# Patient Record
Sex: MALE | Race: WHITE | NOT HISPANIC OR LATINO | Employment: OTHER | URBAN - METROPOLITAN AREA
[De-identification: names, ages, dates, MRNs, and addresses within clinical notes are randomized per-mention and may not be internally consistent; named-entity substitution may affect disease eponyms.]

---

## 2020-12-02 ENCOUNTER — HISTORICAL (OUTPATIENT)
Dept: MEDSURG UNIT | Facility: HOSPITAL | Age: 65
End: 2020-12-02

## 2020-12-03 LAB
ABS NEUT (OLG): 4.69 X10(3)/MCL (ref 2.1–9.2)
ALBUMIN SERPL-MCNC: 3.5 GM/DL (ref 3.4–4.8)
ALBUMIN/GLOB SERPL: 1.3 RATIO (ref 1.1–2)
ALP SERPL-CCNC: 139 UNIT/L (ref 40–150)
ALT SERPL-CCNC: 23 UNIT/L (ref 0–55)
AST SERPL-CCNC: 25 UNIT/L (ref 5–34)
BASOPHILS # BLD AUTO: 0 X10(3)/MCL (ref 0–0.2)
BASOPHILS NFR BLD AUTO: 0 %
BILIRUB SERPL-MCNC: 0.9 MG/DL
BILIRUBIN DIRECT+TOT PNL SERPL-MCNC: 0.4 MG/DL (ref 0–0.5)
BILIRUBIN DIRECT+TOT PNL SERPL-MCNC: 0.5 MG/DL (ref 0–0.8)
BUN SERPL-MCNC: 16.7 MG/DL (ref 8.4–25.7)
CALCIUM SERPL-MCNC: 8.7 MG/DL (ref 8.8–10)
CHLORIDE SERPL-SCNC: 105 MMOL/L (ref 98–107)
CO2 SERPL-SCNC: 22 MMOL/L (ref 23–31)
CREAT SERPL-MCNC: 0.81 MG/DL (ref 0.73–1.18)
EOSINOPHIL # BLD AUTO: 0.4 X10(3)/MCL (ref 0–0.9)
EOSINOPHIL NFR BLD AUTO: 5 %
ERYTHROCYTE [DISTWIDTH] IN BLOOD BY AUTOMATED COUNT: 13.9 % (ref 11.5–17)
GLOBULIN SER-MCNC: 2.6 GM/DL (ref 2.4–3.5)
GLUCOSE SERPL-MCNC: 95 MG/DL (ref 82–115)
HCT VFR BLD AUTO: 45 % (ref 42–52)
HGB BLD-MCNC: 14.5 GM/DL (ref 14–18)
LYMPHOCYTES # BLD AUTO: 2.4 X10(3)/MCL (ref 0.6–4.6)
LYMPHOCYTES NFR BLD AUTO: 28 %
MCH RBC QN AUTO: 30.7 PG (ref 27–31)
MCHC RBC AUTO-ENTMCNC: 32.2 GM/DL (ref 33–36)
MCV RBC AUTO: 95.3 FL (ref 80–94)
MONOCYTES # BLD AUTO: 0.9 X10(3)/MCL (ref 0.1–1.3)
MONOCYTES NFR BLD AUTO: 11 %
NEUTROPHILS # BLD AUTO: 4.69 X10(3)/MCL (ref 2.1–9.2)
NEUTROPHILS NFR BLD AUTO: 56 %
PLATELET # BLD AUTO: 194 X10(3)/MCL (ref 130–400)
PMV BLD AUTO: 9.4 FL (ref 9.4–12.4)
POTASSIUM SERPL-SCNC: 4.5 MMOL/L (ref 3.5–5.1)
PROT SERPL-MCNC: 6.1 GM/DL (ref 5.8–7.6)
RBC # BLD AUTO: 4.72 X10(6)/MCL (ref 4.7–6.1)
SODIUM SERPL-SCNC: 133 MMOL/L (ref 136–145)
WBC # SPEC AUTO: 8.4 X10(3)/MCL (ref 4.5–11.5)

## 2023-11-17 DIAGNOSIS — I73.9 PAD (PERIPHERAL ARTERY DISEASE): Primary | ICD-10-CM

## 2023-11-17 RX ORDER — NITROFURANTOIN 25; 75 MG/1; MG/1
100 CAPSULE ORAL NIGHTLY
Status: ON HOLD | COMMUNITY
Start: 2023-09-28 | End: 2023-11-29 | Stop reason: HOSPADM

## 2023-11-17 RX ORDER — CLOPIDOGREL BISULFATE 75 MG/1
75 TABLET ORAL DAILY
COMMUNITY
Start: 2023-11-10

## 2023-11-17 RX ORDER — METOPROLOL SUCCINATE 25 MG/1
25 TABLET, EXTENDED RELEASE ORAL DAILY
COMMUNITY
Start: 2023-10-28

## 2023-11-17 RX ORDER — FINASTERIDE 5 MG/1
5 TABLET, FILM COATED ORAL DAILY
COMMUNITY
Start: 2023-10-28

## 2023-11-17 RX ORDER — LOSARTAN POTASSIUM 50 MG/1
50 TABLET ORAL DAILY
COMMUNITY
Start: 2023-09-29

## 2023-11-17 RX ORDER — DOXYCYCLINE 100 MG/1
100 CAPSULE ORAL 2 TIMES DAILY
Status: ON HOLD | COMMUNITY
Start: 2023-11-10 | End: 2023-11-29 | Stop reason: HOSPADM

## 2023-11-17 RX ORDER — ATORVASTATIN CALCIUM 80 MG/1
80 TABLET, FILM COATED ORAL NIGHTLY
COMMUNITY
Start: 2023-11-10

## 2023-11-17 RX ORDER — RIVAROXABAN 2.5 MG/1
2.5 TABLET, FILM COATED ORAL 2 TIMES DAILY
COMMUNITY
Start: 2023-11-15

## 2023-11-17 RX ORDER — MUPIROCIN 20 MG/G
OINTMENT TOPICAL 2 TIMES DAILY
COMMUNITY
Start: 2023-09-27

## 2023-11-17 RX ORDER — TRAZODONE HYDROCHLORIDE 50 MG/1
50 TABLET ORAL NIGHTLY PRN
COMMUNITY
Start: 2023-10-23 | End: 2023-11-20

## 2023-11-17 NOTE — H&P (VIEW-ONLY)
"    [facility] - Clinic Note  James Marx MD      Patient Name: Deniz Pace                   : 1955      MRN: [id number]   Visit Date: 2023       History Present Illness     Reason for Visit: Arterial Disease    Mr. Pace presents to the clinic in need of evaluation of lower extremity bypass. He is being referred by Dr. Burciaga. He was recently discharged from [facility].   He is s/p second and third toe surgery secondary to hammertoe 11/3/23. Admitted to [facility] 11/15/23 for peripheral angiogram done during hospitalization. 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 He has had multiple interventions without improvement. He states he gets claudication at approximately 35 yards. He is walking less at this time due to his wound.  He denies history of smoking or diabetes.  He is on aspirin, Xarelto, and Plavix.      REVIEW OF SYSTEMS:    12 point review of systems conducted, negative except as stated in the history of present illness. See HPI for details.        Physical Exam      Vitals:    23 0903 23 0906   BP: (!) 151/81 (!) 150/79   BP Location: Right arm Left arm   Pulse: 65 66   Weight: 74.8 kg (165 lb)    Height: 5' 8" (1.727 m)           General: well-nourished, no acute distress, and healthy appearing, ambulating normally, alert, pleasant, conversant, and oriented  Neurologic: cranial nerves are grossly intact, no neurologic deficits, no motor deficits, and no sensory deficits  HEENT: grossly normal and no scleral icterus  Neck/Chest: normal , soft without lymphadenopathy, and palpable carotid pulses bilaterally  Respiratory: breathing easily, without respiratory distress, and normal breath sounds  Abdomen: normal and soft  Cardiology: regular rate and rhythm and no audible murmur    Upper Extremity Arterial Exam:   Right - radial is palpable and brachial is palpable  Left - radial is palpable and brachial is palpable    Lower Extremity " Arterial Exam:  Right - posterior tibial is monophasic with doppler and dorsalis pedis is monophasic with doppler    Musculoskeletal:   Lower Extremity: no edema present to bilateral lower extremities, Right second toe necrotic wound            Assessment and Plan     Mr. Pace is a 68 y.o. with right lower extremity critical limb ischemia.  He was failed multiple interventions from an endovascular standpoint in regards to revascularization of his right lower extremity.  I have discussed with him based on femoral to tibial bypass as.  His popliteal below-the-knee is occluded as is his TP trunk.  His AT is heavily calcified.  His PT and peroneal are patent and is PT does appear relatively healthy.  We will have him vein mapped in the office to determine if he was adequate saphenous vein, otherwise we will proceed with prosthetic bypass.  I have discussed this procedure with him including risks and benefits.  He was include but are not limited to infection, bleeding, scar, graft failure, and possible amputation.    1. 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  He was right SFA and popliteal has a subintimal stent was does have stenosis within it.  The popliteal occludes just above the knee.  He does reconstitute the trifurcation via collaterals.  The distal popliteal and TP trunk were all occluded.  The AT is patent onto the ankle, but does appear calcified.  The peroneal was smaller diameter but is patent.  The PT does not appear healthy a noncalcified and patent to the ankle.      Medical History     Past Medical History:   Diagnosis Date    Bipolar 1 disorder     CAD (coronary artery disease)     Exertional dyspnea     Hepatitis C     Lung mass  06/29/2016    right lung mass - Dr. Silverio Schroeder    MI (myocardial infarction)     LEONID (obstructive sleep apnea)     PVD (peripheral vascular disease)      Past Surgical History:   Procedure Laterality Date    CORONARY ARTERY BYPASS GRAFT  2013    FEMORAL ARTERY STENT Left     HIP SURGERY       History reviewed. No pertinent family history.  Social History     Socioeconomic History    Marital status: Single   Tobacco Use    Smoking status: Never    Smokeless tobacco: Never     Current Outpatient Medications   Medication Instructions    amoxicillin (AMOXIL) 875 mg, Oral, 2 times daily    aspirin (ECOTRIN) 81 mg, Oral, Daily    atorvastatin (LIPITOR) 80 mg, Oral, Nightly    clopidogreL (PLAVIX) 75 mg, Oral    doxycycline (MONODOX) 100 mg, Oral, 2 times daily    DULoxetine (CYMBALTA) 60 mg, Oral, Daily    finasteride (PROSCAR) 5 mg, Oral    furosemide (LASIX) 20 mg, Oral, Daily    losartan (COZAAR) 50 mg, Oral    metoprolol succinate (TOPROL-XL) 25 mg, Oral    mupirocin (BACTROBAN) 2 % ointment Topical (Top), 2 times daily    nitrofurantoin, macrocrystal-monohydrate, (MACROBID) 100 MG capsule 100 mg, Oral, Nightly    POLYETHYLENE GLYCOL 3350 (MIRALAX ORAL) Oral    QUEtiapine (SEROQUEL) 200 mg, Oral, Nightly    XARELTO 2.5 mg Tab Oral     Review of patient's allergies indicates:   Allergen Reactions    Lithium analogues Other (See Comments)     coma       Patient Care Team:  No, Primary Doctor as PCP - Iggy Hernandez MD as Consulting Physician (Cardiovascular Disease)        No follow-ups on file. In addition to their scheduled follow up, the patient has also been instructed to follow up on as needed basis.     No future appointments.

## 2023-11-17 NOTE — PROGRESS NOTES
"    [facility] - Clinic Note  James Marx MD      Patient Name: Deniz Pace                   : 1955      MRN: [id number]   Visit Date: 2023       History Present Illness     Reason for Visit: Arterial Disease    Mr. Pace presents to the clinic in need of evaluation of lower extremity bypass. He is being referred by Dr. Burciaga. He was recently discharged from [facility].   He is s/p second and third toe surgery secondary to hammertoe 11/3/23. Admitted to [facility] 11/15/23 for peripheral angiogram done during hospitalization. 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 He has had multiple interventions without improvement. He states he gets claudication at approximately 35 yards. He is walking less at this time due to his wound.  He denies history of smoking or diabetes.  He is on aspirin, Xarelto, and Plavix.      REVIEW OF SYSTEMS:    12 point review of systems conducted, negative except as stated in the history of present illness. See HPI for details.        Physical Exam      Vitals:    23 0903 23 0906   BP: (!) 151/81 (!) 150/79   BP Location: Right arm Left arm   Pulse: 65 66   Weight: 74.8 kg (165 lb)    Height: 5' 8" (1.727 m)           General: well-nourished, no acute distress, and healthy appearing, ambulating normally, alert, pleasant, conversant, and oriented  Neurologic: cranial nerves are grossly intact, no neurologic deficits, no motor deficits, and no sensory deficits  HEENT: grossly normal and no scleral icterus  Neck/Chest: normal , soft without lymphadenopathy, and palpable carotid pulses bilaterally  Respiratory: breathing easily, without respiratory distress, and normal breath sounds  Abdomen: normal and soft  Cardiology: regular rate and rhythm and no audible murmur    Upper Extremity Arterial Exam:   Right - radial is palpable and brachial is palpable  Left - radial is palpable and brachial is palpable    Lower Extremity " Arterial Exam:  Right - posterior tibial is monophasic with doppler and dorsalis pedis is monophasic with doppler    Musculoskeletal:   Lower Extremity: no edema present to bilateral lower extremities, Right second toe necrotic wound            Assessment and Plan     Mr. Pace is a 68 y.o. with right lower extremity critical limb ischemia.  He was failed multiple interventions from an endovascular standpoint in regards to revascularization of his right lower extremity.  I have discussed with him based on femoral to tibial bypass as.  His popliteal below-the-knee is occluded as is his TP trunk.  His AT is heavily calcified.  His PT and peroneal are patent and is PT does appear relatively healthy.  We will have him vein mapped in the office to determine if he was adequate saphenous vein, otherwise we will proceed with prosthetic bypass.  I have discussed this procedure with him including risks and benefits.  He was include but are not limited to infection, bleeding, scar, graft failure, and possible amputation.    1. 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  He was right SFA and popliteal has a subintimal stent was does have stenosis within it.  The popliteal occludes just above the knee.  He does reconstitute the trifurcation via collaterals.  The distal popliteal and TP trunk were all occluded.  The AT is patent onto the ankle, but does appear calcified.  The peroneal was smaller diameter but is patent.  The PT does not appear healthy a noncalcified and patent to the ankle.      Medical History     Past Medical History:   Diagnosis Date    Bipolar 1 disorder     CAD (coronary artery disease)     Exertional dyspnea     Hepatitis C     Lung mass  06/29/2016    right lung mass - Dr. Silverio Schroeder    MI (myocardial infarction)     LEONID (obstructive sleep apnea)     PVD (peripheral vascular disease)      Past Surgical History:   Procedure Laterality Date    CORONARY ARTERY BYPASS GRAFT  2013    FEMORAL ARTERY STENT Left     HIP SURGERY       History reviewed. No pertinent family history.  Social History     Socioeconomic History    Marital status: Single   Tobacco Use    Smoking status: Never    Smokeless tobacco: Never     Current Outpatient Medications   Medication Instructions    amoxicillin (AMOXIL) 875 mg, Oral, 2 times daily    aspirin (ECOTRIN) 81 mg, Oral, Daily    atorvastatin (LIPITOR) 80 mg, Oral, Nightly    clopidogreL (PLAVIX) 75 mg, Oral    doxycycline (MONODOX) 100 mg, Oral, 2 times daily    DULoxetine (CYMBALTA) 60 mg, Oral, Daily    finasteride (PROSCAR) 5 mg, Oral    furosemide (LASIX) 20 mg, Oral, Daily    losartan (COZAAR) 50 mg, Oral    metoprolol succinate (TOPROL-XL) 25 mg, Oral    mupirocin (BACTROBAN) 2 % ointment Topical (Top), 2 times daily    nitrofurantoin, macrocrystal-monohydrate, (MACROBID) 100 MG capsule 100 mg, Oral, Nightly    POLYETHYLENE GLYCOL 3350 (MIRALAX ORAL) Oral    QUEtiapine (SEROQUEL) 200 mg, Oral, Nightly    XARELTO 2.5 mg Tab Oral     Review of patient's allergies indicates:   Allergen Reactions    Lithium analogues Other (See Comments)     coma       Patient Care Team:  No, Primary Doctor as PCP - Iggy Hernandez MD as Consulting Physician (Cardiovascular Disease)        No follow-ups on file. In addition to their scheduled follow up, the patient has also been instructed to follow up on as needed basis.     No future appointments.

## 2023-11-20 ENCOUNTER — HOSPITAL ENCOUNTER (OUTPATIENT)
Dept: RADIOLOGY | Facility: HOSPITAL | Age: 68
Discharge: HOME | End: 2023-11-20
Attending: SURGERY
Payer: MEDICARE

## 2023-11-20 ENCOUNTER — OFFICE VISIT (OUTPATIENT)
Dept: VASCULAR SURGERY | Facility: CLINIC | Age: 68
End: 2023-11-20
Payer: MEDICARE

## 2023-11-20 ENCOUNTER — ANESTHESIA EVENT (OUTPATIENT)
Dept: SURGERY | Facility: HOSPITAL | Age: 68
DRG: 253 | End: 2023-11-20
Payer: MEDICARE

## 2023-11-20 VITALS
DIASTOLIC BLOOD PRESSURE: 79 MMHG | SYSTOLIC BLOOD PRESSURE: 150 MMHG | BODY MASS INDEX: 25.01 KG/M2 | HEART RATE: 66 BPM | HEIGHT: 68 IN | WEIGHT: 165 LBS

## 2023-11-20 DIAGNOSIS — I96 GANGRENE, NOT ELSEWHERE CLASSIFIED: ICD-10-CM

## 2023-11-20 DIAGNOSIS — Z01.818 OTHER SPECIFIED PRE-OPERATIVE EXAMINATION: ICD-10-CM

## 2023-11-20 DIAGNOSIS — I70.335 ATHEROSCLEROSIS OF BYPASS GRAFT OF RIGHT LOWER EXTREMITY WITH ULCERATION OF OTHER PART OF FOOT: ICD-10-CM

## 2023-11-20 DIAGNOSIS — I74.3 EMBOLISM AND THROMBOSIS OF ARTERIES OF LOWER EXTREMITY: ICD-10-CM

## 2023-11-20 DIAGNOSIS — I73.9 PAD (PERIPHERAL ARTERY DISEASE): ICD-10-CM

## 2023-11-20 DIAGNOSIS — I70.335 ATHEROSCLEROSIS OF BYPASS GRAFT OF RIGHT LOWER EXTREMITY WITH ULCERATION OF OTHER PART OF FOOT: Primary | ICD-10-CM

## 2023-11-20 DIAGNOSIS — I70.461: ICD-10-CM

## 2023-11-20 PROCEDURE — 71046 X-RAY EXAM CHEST 2 VIEWS: CPT | Mod: TC

## 2023-11-20 PROCEDURE — 99204 OFFICE O/P NEW MOD 45 MIN: CPT | Mod: ,,, | Performed by: SURGERY

## 2023-11-20 PROCEDURE — 99204 PR OFFICE/OUTPT VISIT, NEW, LEVL IV, 45-59 MIN: ICD-10-PCS | Mod: ,,, | Performed by: SURGERY

## 2023-11-20 RX ORDER — MUPIROCIN 20 MG/G
OINTMENT TOPICAL
Status: CANCELLED | OUTPATIENT
Start: 2023-11-20

## 2023-11-20 RX ORDER — SODIUM CHLORIDE 9 MG/ML
INJECTION, SOLUTION INTRAVENOUS CONTINUOUS
Status: CANCELLED | OUTPATIENT
Start: 2023-11-20

## 2023-11-20 RX ORDER — AMOXICILLIN 875 MG/1
875 TABLET, FILM COATED ORAL 2 TIMES DAILY
Status: ON HOLD | COMMUNITY
Start: 2023-11-18 | End: 2023-11-29 | Stop reason: HOSPADM

## 2023-11-21 RX ORDER — NAPROXEN SOD/DIPHENHYDRAMINE 220MG-25MG
2 TABLET ORAL NIGHTLY
COMMUNITY

## 2023-11-21 RX ORDER — CHOLECALCIFEROL (VITAMIN D3) 50 MCG
1 TABLET ORAL DAILY
COMMUNITY

## 2023-11-21 RX ORDER — OXYCODONE AND ACETAMINOPHEN 5; 325 MG/1; MG/1
1 TABLET ORAL EVERY 4 HOURS PRN
Status: ON HOLD | COMMUNITY
Start: 2023-11-02 | End: 2023-11-29 | Stop reason: HOSPADM

## 2023-11-21 RX ORDER — SILODOSIN 8 MG/1
8 CAPSULE ORAL DAILY
COMMUNITY

## 2023-11-21 RX ORDER — NAPROXEN SODIUM 220 MG
440 TABLET ORAL 2 TIMES DAILY PRN
COMMUNITY

## 2023-11-27 ENCOUNTER — HOSPITAL ENCOUNTER (INPATIENT)
Facility: HOSPITAL | Age: 68
LOS: 2 days | Discharge: HOME | DRG: 253 | End: 2023-11-29
Attending: SURGERY | Admitting: SURGERY
Payer: MEDICARE

## 2023-11-27 ENCOUNTER — ANESTHESIA (OUTPATIENT)
Dept: SURGERY | Facility: HOSPITAL | Age: 68
DRG: 253 | End: 2023-11-27
Payer: MEDICARE

## 2023-11-27 DIAGNOSIS — I70.335 ATHEROSCLEROSIS OF BYPASS GRAFT OF RIGHT LOWER EXTREMITY WITH ULCERATION OF OTHER PART OF FOOT: ICD-10-CM

## 2023-11-27 DIAGNOSIS — I70.221 CRITICAL LIMB ISCHEMIA OF RIGHT LOWER EXTREMITY: Primary | ICD-10-CM

## 2023-11-27 LAB
ABORH RETYPE: NORMAL
GROUP & RH: NORMAL
INDIRECT COOMBS GEL: NORMAL
SPECIMEN OUTDATE: NORMAL

## 2023-11-27 PROCEDURE — 35371 RECHANNELING OF ARTERY: CPT | Mod: 51,RT,, | Performed by: SURGERY

## 2023-11-27 PROCEDURE — 25000003 PHARM REV CODE 250: Performed by: NURSE ANESTHETIST, CERTIFIED REGISTERED

## 2023-11-27 PROCEDURE — 36000709 HC OR TIME LEV III EA ADD 15 MIN: Performed by: SURGERY

## 2023-11-27 PROCEDURE — 35371 PR THROMBOENDARTECTMY FEMORAL COMMON: ICD-10-PCS | Mod: 51,RT,, | Performed by: SURGERY

## 2023-11-27 PROCEDURE — D9220A PRA ANESTHESIA: ICD-10-PCS | Mod: ANES,,, | Performed by: ANESTHESIOLOGY

## 2023-11-27 PROCEDURE — 21400001 HC TELEMETRY ROOM

## 2023-11-27 PROCEDURE — 37000009 HC ANESTHESIA EA ADD 15 MINS: Performed by: SURGERY

## 2023-11-27 PROCEDURE — 36000708 HC OR TIME LEV III 1ST 15 MIN: Performed by: SURGERY

## 2023-11-27 PROCEDURE — 25000003 PHARM REV CODE 250

## 2023-11-27 PROCEDURE — 88304 TISSUE EXAM BY PATHOLOGIST: CPT

## 2023-11-27 PROCEDURE — 63600175 PHARM REV CODE 636 W HCPCS: Performed by: SURGERY

## 2023-11-27 PROCEDURE — 25000003 PHARM REV CODE 250: Performed by: SURGERY

## 2023-11-27 PROCEDURE — 63600175 PHARM REV CODE 636 W HCPCS

## 2023-11-27 PROCEDURE — 63600175 PHARM REV CODE 636 W HCPCS: Performed by: ANESTHESIOLOGY

## 2023-11-27 PROCEDURE — D9220A PRA ANESTHESIA: ICD-10-PCS | Mod: CRNA,,,

## 2023-11-27 PROCEDURE — 35666 PR BYPASS GRAFT OTHR,FEM-TIBIAL: ICD-10-PCS | Mod: RT,,, | Performed by: SURGERY

## 2023-11-27 PROCEDURE — 86923 COMPATIBILITY TEST ELECTRIC: CPT | Mod: 91 | Performed by: SURGERY

## 2023-11-27 PROCEDURE — 27201423 OPTIME MED/SURG SUP & DEVICES STERILE SUPPLY: Performed by: SURGERY

## 2023-11-27 PROCEDURE — 88311 DECALCIFY TISSUE: CPT

## 2023-11-27 PROCEDURE — 88305 TISSUE EXAM BY PATHOLOGIST: CPT | Performed by: SURGERY

## 2023-11-27 PROCEDURE — 86901 BLOOD TYPING SEROLOGIC RH(D): CPT | Performed by: SURGERY

## 2023-11-27 PROCEDURE — D9220A PRA ANESTHESIA: Mod: CRNA,,,

## 2023-11-27 PROCEDURE — 71000033 HC RECOVERY, INTIAL HOUR: Performed by: SURGERY

## 2023-11-27 PROCEDURE — 35666 BPG FEM-ANT TIB PST TIB/PRNL: CPT | Mod: RT,,, | Performed by: SURGERY

## 2023-11-27 PROCEDURE — 63600175 PHARM REV CODE 636 W HCPCS: Performed by: NURSE ANESTHETIST, CERTIFIED REGISTERED

## 2023-11-27 PROCEDURE — 11000001 HC ACUTE MED/SURG PRIVATE ROOM

## 2023-11-27 PROCEDURE — D9220A PRA ANESTHESIA: Mod: ANES,,, | Performed by: ANESTHESIOLOGY

## 2023-11-27 PROCEDURE — C1768 GRAFT, VASCULAR: HCPCS | Performed by: SURGERY

## 2023-11-27 PROCEDURE — 37000008 HC ANESTHESIA 1ST 15 MINUTES: Performed by: SURGERY

## 2023-11-27 DEVICE — PROPATEN VASCULAR GRAFT TW RR 6MMX80CM 60CM RINGS HEPARIN
Type: IMPLANTABLE DEVICE | Site: LEG | Status: FUNCTIONAL
Brand: GORE PROPATEN VASCULAR GRAFT

## 2023-11-27 RX ORDER — MUPIROCIN CALCIUM 20 MG/G
CREAM TOPICAL 3 TIMES DAILY
COMMUNITY

## 2023-11-27 RX ORDER — HYDROMORPHONE HYDROCHLORIDE 2 MG/ML
0.2 INJECTION, SOLUTION INTRAMUSCULAR; INTRAVENOUS; SUBCUTANEOUS EVERY 5 MIN PRN
Status: DISCONTINUED | OUTPATIENT
Start: 2023-11-27 | End: 2023-11-27 | Stop reason: HOSPADM

## 2023-11-27 RX ORDER — ONDANSETRON 2 MG/ML
INJECTION INTRAMUSCULAR; INTRAVENOUS
Status: DISCONTINUED | OUTPATIENT
Start: 2023-11-27 | End: 2023-11-27

## 2023-11-27 RX ORDER — IPRATROPIUM BROMIDE AND ALBUTEROL SULFATE 2.5; .5 MG/3ML; MG/3ML
3 SOLUTION RESPIRATORY (INHALATION) ONCE AS NEEDED
Status: CANCELLED | OUTPATIENT
Start: 2023-11-27 | End: 2035-04-25

## 2023-11-27 RX ORDER — PROPOFOL 10 MG/ML
VIAL (ML) INTRAVENOUS
Status: DISCONTINUED | OUTPATIENT
Start: 2023-11-27 | End: 2023-11-27

## 2023-11-27 RX ORDER — SODIUM CHLORIDE 9 MG/ML
INJECTION, SOLUTION INTRAVENOUS CONTINUOUS
Status: DISCONTINUED | OUTPATIENT
Start: 2023-11-27 | End: 2023-11-28

## 2023-11-27 RX ORDER — CEFAZOLIN SODIUM 1 G/3ML
INJECTION, POWDER, FOR SOLUTION INTRAMUSCULAR; INTRAVENOUS
Status: DISCONTINUED | OUTPATIENT
Start: 2023-11-27 | End: 2023-11-27 | Stop reason: HOSPADM

## 2023-11-27 RX ORDER — FENTANYL CITRATE 50 UG/ML
INJECTION, SOLUTION INTRAMUSCULAR; INTRAVENOUS
Status: DISCONTINUED | OUTPATIENT
Start: 2023-11-27 | End: 2023-11-27

## 2023-11-27 RX ORDER — HYDROMORPHONE HYDROCHLORIDE 2 MG/ML
0.2 INJECTION, SOLUTION INTRAMUSCULAR; INTRAVENOUS; SUBCUTANEOUS EVERY 5 MIN PRN
Status: CANCELLED | OUTPATIENT
Start: 2023-11-27

## 2023-11-27 RX ORDER — CALCIUM CHLORIDE INJECTION 100 MG/ML
INJECTION, SOLUTION INTRAVENOUS
Status: DISCONTINUED | OUTPATIENT
Start: 2023-11-27 | End: 2023-11-27

## 2023-11-27 RX ORDER — SODIUM CHLORIDE 0.9 % (FLUSH) 0.9 %
10 SYRINGE (ML) INJECTION
Status: DISCONTINUED | OUTPATIENT
Start: 2023-11-27 | End: 2023-11-27 | Stop reason: HOSPADM

## 2023-11-27 RX ORDER — SODIUM CHLORIDE 9 MG/ML
INJECTION, SOLUTION INTRAVENOUS CONTINUOUS
Status: CANCELLED | OUTPATIENT
Start: 2023-11-27

## 2023-11-27 RX ORDER — ACETAMINOPHEN 10 MG/ML
INJECTION, SOLUTION INTRAVENOUS
Status: DISCONTINUED | OUTPATIENT
Start: 2023-11-27 | End: 2023-11-27

## 2023-11-27 RX ORDER — DIPHENHYDRAMINE HYDROCHLORIDE 50 MG/ML
25 INJECTION INTRAMUSCULAR; INTRAVENOUS EVERY 6 HOURS PRN
Status: CANCELLED | OUTPATIENT
Start: 2023-11-27

## 2023-11-27 RX ORDER — PROTAMINE SULFATE 10 MG/ML
INJECTION, SOLUTION INTRAVENOUS
Status: DISCONTINUED | OUTPATIENT
Start: 2023-11-27 | End: 2023-11-27

## 2023-11-27 RX ORDER — HEPARIN SODIUM 1000 [USP'U]/ML
INJECTION, SOLUTION INTRAVENOUS; SUBCUTANEOUS
Status: DISCONTINUED | OUTPATIENT
Start: 2023-11-27 | End: 2023-11-27 | Stop reason: HOSPADM

## 2023-11-27 RX ORDER — MIDAZOLAM HYDROCHLORIDE 1 MG/ML
2 INJECTION INTRAMUSCULAR; INTRAVENOUS ONCE AS NEEDED
Status: COMPLETED | OUTPATIENT
Start: 2023-11-27 | End: 2023-11-27

## 2023-11-27 RX ORDER — HYDROCODONE BITARTRATE AND ACETAMINOPHEN 5; 325 MG/1; MG/1
1 TABLET ORAL
Status: CANCELLED | OUTPATIENT
Start: 2023-11-27

## 2023-11-27 RX ORDER — CEFAZOLIN SODIUM 2 G/50ML
2 SOLUTION INTRAVENOUS
Status: DISCONTINUED | OUTPATIENT
Start: 2023-11-27 | End: 2023-11-27 | Stop reason: HOSPADM

## 2023-11-27 RX ORDER — MEPERIDINE HYDROCHLORIDE 25 MG/ML
12.5 INJECTION INTRAMUSCULAR; INTRAVENOUS; SUBCUTANEOUS ONCE
Status: CANCELLED | OUTPATIENT
Start: 2023-11-27 | End: 2023-11-27

## 2023-11-27 RX ORDER — MUPIROCIN 20 MG/G
OINTMENT TOPICAL 2 TIMES DAILY
Status: DISCONTINUED | OUTPATIENT
Start: 2023-11-28 | End: 2023-11-29 | Stop reason: HOSPADM

## 2023-11-27 RX ORDER — MUPIROCIN 20 MG/G
OINTMENT TOPICAL
Status: DISCONTINUED | OUTPATIENT
Start: 2023-11-27 | End: 2023-11-27 | Stop reason: HOSPADM

## 2023-11-27 RX ORDER — ONDANSETRON 2 MG/ML
4 INJECTION INTRAMUSCULAR; INTRAVENOUS DAILY PRN
Status: DISCONTINUED | OUTPATIENT
Start: 2023-11-27 | End: 2023-11-27 | Stop reason: HOSPADM

## 2023-11-27 RX ORDER — HEPARIN SODIUM 1000 [USP'U]/ML
INJECTION, SOLUTION INTRAVENOUS; SUBCUTANEOUS
Status: DISCONTINUED | OUTPATIENT
Start: 2023-11-27 | End: 2023-11-27

## 2023-11-27 RX ORDER — METOCLOPRAMIDE HYDROCHLORIDE 5 MG/ML
10 INJECTION INTRAMUSCULAR; INTRAVENOUS ONCE
Status: CANCELLED | OUTPATIENT
Start: 2023-11-27 | End: 2023-11-27

## 2023-11-27 RX ORDER — MIDAZOLAM HYDROCHLORIDE 1 MG/ML
INJECTION INTRAMUSCULAR; INTRAVENOUS
Status: COMPLETED
Start: 2023-11-27 | End: 2023-11-27

## 2023-11-27 RX ORDER — ONDANSETRON 2 MG/ML
4 INJECTION INTRAMUSCULAR; INTRAVENOUS ONCE
Status: CANCELLED | OUTPATIENT
Start: 2023-11-27 | End: 2023-11-27

## 2023-11-27 RX ORDER — DROPERIDOL 2.5 MG/ML
0.62 INJECTION, SOLUTION INTRAMUSCULAR; INTRAVENOUS ONCE AS NEEDED
Status: DISCONTINUED | OUTPATIENT
Start: 2023-11-27 | End: 2023-11-27 | Stop reason: HOSPADM

## 2023-11-27 RX ORDER — ACETAMINOPHEN 500 MG
1000 TABLET ORAL ONCE
Status: CANCELLED | OUTPATIENT
Start: 2023-11-27 | End: 2023-11-27

## 2023-11-27 RX ORDER — HEPARIN 100 UNIT/ML
500 SYRINGE INTRAVENOUS
Status: DISCONTINUED | OUTPATIENT
Start: 2023-11-27 | End: 2023-11-27 | Stop reason: HOSPADM

## 2023-11-27 RX ORDER — MEPERIDINE HYDROCHLORIDE 25 MG/ML
12.5 INJECTION INTRAMUSCULAR; INTRAVENOUS; SUBCUTANEOUS EVERY 10 MIN PRN
Status: DISCONTINUED | OUTPATIENT
Start: 2023-11-27 | End: 2023-11-27 | Stop reason: HOSPADM

## 2023-11-27 RX ORDER — FAMOTIDINE 10 MG/ML
20 INJECTION INTRAVENOUS ONCE
Status: CANCELLED | OUTPATIENT
Start: 2023-11-27 | End: 2023-11-27

## 2023-11-27 RX ORDER — QUETIAPINE 200 MG/1
200 TABLET, FILM COATED, EXTENDED RELEASE ORAL DAILY
COMMUNITY

## 2023-11-27 RX ORDER — MIDAZOLAM HYDROCHLORIDE 1 MG/ML
2 INJECTION INTRAMUSCULAR; INTRAVENOUS ONCE AS NEEDED
Status: CANCELLED | OUTPATIENT
Start: 2023-11-27 | End: 2035-04-25

## 2023-11-27 RX ORDER — METOCLOPRAMIDE HYDROCHLORIDE 5 MG/ML
10 INJECTION INTRAMUSCULAR; INTRAVENOUS EVERY 10 MIN PRN
Status: CANCELLED | OUTPATIENT
Start: 2023-11-27

## 2023-11-27 RX ORDER — LIDOCAINE HYDROCHLORIDE 10 MG/ML
1 INJECTION, SOLUTION EPIDURAL; INFILTRATION; INTRACAUDAL; PERINEURAL ONCE
Status: CANCELLED | OUTPATIENT
Start: 2023-11-27 | End: 2023-11-27

## 2023-11-27 RX ORDER — CEFAZOLIN SODIUM 2 G/50ML
2 SOLUTION INTRAVENOUS
Status: COMPLETED | OUTPATIENT
Start: 2023-11-28 | End: 2023-11-28

## 2023-11-27 RX ORDER — DEXAMETHASONE SODIUM PHOSPHATE 4 MG/ML
INJECTION, SOLUTION INTRA-ARTICULAR; INTRALESIONAL; INTRAMUSCULAR; INTRAVENOUS; SOFT TISSUE
Status: DISCONTINUED | OUTPATIENT
Start: 2023-11-27 | End: 2023-11-27

## 2023-11-27 RX ORDER — ROCURONIUM BROMIDE 10 MG/ML
INJECTION, SOLUTION INTRAVENOUS
Status: DISCONTINUED | OUTPATIENT
Start: 2023-11-27 | End: 2023-11-27

## 2023-11-27 RX ORDER — MORPHINE SULFATE 4 MG/ML
3 INJECTION, SOLUTION INTRAMUSCULAR; INTRAVENOUS EVERY 4 HOURS PRN
Status: DISCONTINUED | OUTPATIENT
Start: 2023-11-27 | End: 2023-11-29 | Stop reason: HOSPADM

## 2023-11-27 RX ORDER — LIDOCAINE HYDROCHLORIDE 20 MG/ML
INJECTION, SOLUTION EPIDURAL; INFILTRATION; INTRACAUDAL; PERINEURAL
Status: DISCONTINUED | OUTPATIENT
Start: 2023-11-27 | End: 2023-11-27

## 2023-11-27 RX ORDER — CEFAZOLIN SODIUM 1 G/3ML
INJECTION, POWDER, FOR SOLUTION INTRAMUSCULAR; INTRAVENOUS
Status: DISCONTINUED | OUTPATIENT
Start: 2023-11-27 | End: 2023-11-27

## 2023-11-27 RX ADMIN — CALCIUM CHLORIDE INJECTION 0.5 G: 100 INJECTION, SOLUTION INTRAVENOUS at 09:11

## 2023-11-27 RX ADMIN — FENTANYL CITRATE 50 MCG: 50 INJECTION, SOLUTION INTRAMUSCULAR; INTRAVENOUS at 06:11

## 2023-11-27 RX ADMIN — DEXAMETHASONE SODIUM PHOSPHATE 4 MG: 4 INJECTION, SOLUTION INTRA-ARTICULAR; INTRALESIONAL; INTRAMUSCULAR; INTRAVENOUS; SOFT TISSUE at 07:11

## 2023-11-27 RX ADMIN — SUGAMMADEX 144 MG: 100 INJECTION, SOLUTION INTRAVENOUS at 10:11

## 2023-11-27 RX ADMIN — Medication 50 MG: at 07:11

## 2023-11-27 RX ADMIN — DEXAMETHASONE SODIUM PHOSPHATE 4 MG: 4 INJECTION, SOLUTION INTRA-ARTICULAR; INTRALESIONAL; INTRAMUSCULAR; INTRAVENOUS; SOFT TISSUE at 09:11

## 2023-11-27 RX ADMIN — ROCURONIUM BROMIDE 25 MG: 10 SOLUTION INTRAVENOUS at 07:11

## 2023-11-27 RX ADMIN — HYDROMORPHONE HYDROCHLORIDE 0.2 MG: 2 INJECTION, SOLUTION INTRAMUSCULAR; INTRAVENOUS; SUBCUTANEOUS at 10:11

## 2023-11-27 RX ADMIN — SODIUM CHLORIDE, SODIUM GLUCONATE, SODIUM ACETATE, POTASSIUM CHLORIDE AND MAGNESIUM CHLORIDE: 526; 502; 368; 37; 30 INJECTION, SOLUTION INTRAVENOUS at 08:11

## 2023-11-27 RX ADMIN — ONDANSETRON 4 MG: 2 INJECTION INTRAMUSCULAR; INTRAVENOUS at 09:11

## 2023-11-27 RX ADMIN — SODIUM CHLORIDE, SODIUM GLUCONATE, SODIUM ACETATE, POTASSIUM CHLORIDE AND MAGNESIUM CHLORIDE: 526; 502; 368; 37; 30 INJECTION, SOLUTION INTRAVENOUS at 06:11

## 2023-11-27 RX ADMIN — HYDROMORPHONE HYDROCHLORIDE 0.2 MG: 2 INJECTION, SOLUTION INTRAMUSCULAR; INTRAVENOUS; SUBCUTANEOUS at 11:11

## 2023-11-27 RX ADMIN — LIDOCAINE HYDROCHLORIDE 60 MG: 20 INJECTION, SOLUTION EPIDURAL; INFILTRATION; INTRACAUDAL; PERINEURAL at 10:11

## 2023-11-27 RX ADMIN — MIDAZOLAM HYDROCHLORIDE 2 MG: 1 INJECTION, SOLUTION INTRAMUSCULAR; INTRAVENOUS at 06:11

## 2023-11-27 RX ADMIN — HEPARIN SODIUM 7000 UNITS: 1000 INJECTION, SOLUTION INTRAVENOUS; SUBCUTANEOUS at 08:11

## 2023-11-27 RX ADMIN — ACETAMINOPHEN 1000 MG: 10 INJECTION, SOLUTION INTRAVENOUS at 09:11

## 2023-11-27 RX ADMIN — FENTANYL CITRATE 50 MCG: 50 INJECTION, SOLUTION INTRAMUSCULAR; INTRAVENOUS at 10:11

## 2023-11-27 RX ADMIN — MUPIROCIN: 20 OINTMENT TOPICAL at 11:11

## 2023-11-27 RX ADMIN — FENTANYL CITRATE 50 MCG: 50 INJECTION, SOLUTION INTRAMUSCULAR; INTRAVENOUS at 07:11

## 2023-11-27 RX ADMIN — ROCURONIUM BROMIDE 25 MG: 10 SOLUTION INTRAVENOUS at 08:11

## 2023-11-27 RX ADMIN — PROTAMINE SULFATE 50 MG: 10 INJECTION, SOLUTION INTRAVENOUS at 09:11

## 2023-11-27 RX ADMIN — Medication 150 MG: at 06:11

## 2023-11-27 RX ADMIN — FENTANYL CITRATE 50 MCG: 50 INJECTION, SOLUTION INTRAMUSCULAR; INTRAVENOUS at 09:11

## 2023-11-27 RX ADMIN — ROCURONIUM BROMIDE 50 MG: 10 SOLUTION INTRAVENOUS at 06:11

## 2023-11-27 RX ADMIN — ROCURONIUM BROMIDE 10 MG: 10 SOLUTION INTRAVENOUS at 08:11

## 2023-11-27 RX ADMIN — HEPARIN SODIUM 1000 UNITS: 1000 INJECTION, SOLUTION INTRAVENOUS; SUBCUTANEOUS at 09:11

## 2023-11-27 RX ADMIN — CEFAZOLIN 2 G: 330 INJECTION, POWDER, FOR SOLUTION INTRAMUSCULAR; INTRAVENOUS at 07:11

## 2023-11-27 RX ADMIN — MIDAZOLAM HYDROCHLORIDE 2 MG: 1 INJECTION INTRAMUSCULAR; INTRAVENOUS at 06:11

## 2023-11-27 RX ADMIN — LIDOCAINE HYDROCHLORIDE 60 MG: 20 INJECTION, SOLUTION EPIDURAL; INFILTRATION; INTRACAUDAL; PERINEURAL at 06:11

## 2023-11-27 NOTE — ANESTHESIA PREPROCEDURE EVALUATION
11/27/2023  Deniz Pace is a 68 y.o., male with right lower extremity critical limb ischemia.  He was failed multiple interventions from an endovascular standpoint in regards to revascularization of his right lower extremity.  Today he presents for right fem-tib bypass.    MI (myocardial infarction)    Other Medical History   Bipolar 1 disorder CAD (coronary artery disease)   Hepatitis C PVD (peripheral vascular disease)   Lung mass Exertional dyspnea   LEONID (obstructive sleep apnea) Hypertension   HLD (hyperlipidemia) Atherosclerosis of bypass graft of right lower extremity with ulceration of other part of foot   Bladder tumor Arthritis   Neuropathy      Surgical History    CORONARY ARTERY BYPASS GRAFT FEMORAL ARTERY STENT   HIP REPLACEMENT ARTHROPLASTY CORONARY STENT PLACEMENT   COLONOSCOPY INSERTION OF PACEMAKER   TOTAL KNEE ARTHROPLASTY TOTAL SHOULDER ARTHROPLASTY       H/H: 11/34    EKG: AV dual-paced complexes       Pre-op Assessment    I have reviewed the Patient Summary Reports.     I have reviewed the Nursing Notes. I have reviewed the NPO Status.   I have reviewed the Medications.     Review of Systems  Anesthesia Hx:  No problems with previous Anesthesia                Social:  Non-Smoker       Hematology/Oncology:       -- Anemia:                    --  Cancer in past history:                     Cardiovascular:     Hypertension  Past MI CAD   CABG/stent       PVD    ECG has been reviewed.                          Pulmonary:      Shortness of breath  Sleep Apnea                Hepatic/GI:      Liver Disease, Hepatitis           Psych:  Psychiatric History                Physical Exam  General: Well nourished, Cooperative, Alert and Oriented    Airway:  Mallampati: IV   Mouth Opening: Small, but > 3cm  TM Distance: Normal  Tongue: Normal  Neck ROM: Normal  ROM    Dental:  Intact    Chest/Lungs:  Clear to auscultation, Normal Respiratory Rate    Heart:  Rate: Normal  Rhythm: Regular Rhythm  Sounds: Normal    Abdomen:  Normal, Soft, Nontender      Anesthesia Plan  Type of Anesthesia, risks & benefits discussed:    Anesthesia Type: Gen ETT  Intra-op Monitoring Plan: Art Line  Post Op Pain Control Plan: multimodal analgesia  Induction:  IV  Airway Plan: Video and Direct  Informed Consent: Informed consent signed with the Patient and all parties understand the risks and agree with anesthesia plan.  All questions answered.   ASA Score: 4  Day of Surgery Review of History & Physical: H&P Update referred to the surgeon/provider.  Anesthesia Plan Notes: Art line, 2nd PIV, videolaryngoscopy PRN    Ready For Surgery From Anesthesia Perspective.     .

## 2023-11-28 LAB
BASOPHILS # BLD AUTO: 0.01 X10(3)/MCL
BASOPHILS NFR BLD AUTO: 0.1 %
EOSINOPHIL # BLD AUTO: 0 X10(3)/MCL (ref 0–0.9)
EOSINOPHIL NFR BLD AUTO: 0 %
ERYTHROCYTE [DISTWIDTH] IN BLOOD BY AUTOMATED COUNT: 15.3 % (ref 11.5–17)
HCT VFR BLD AUTO: 30.9 % (ref 42–52)
HGB BLD-MCNC: 10.2 G/DL (ref 14–18)
IMM GRANULOCYTES # BLD AUTO: 0.04 X10(3)/MCL (ref 0–0.04)
IMM GRANULOCYTES NFR BLD AUTO: 0.4 %
LYMPHOCYTES # BLD AUTO: 1.2 X10(3)/MCL (ref 0.6–4.6)
LYMPHOCYTES NFR BLD AUTO: 11.2 %
MCH RBC QN AUTO: 30.9 PG (ref 27–31)
MCHC RBC AUTO-ENTMCNC: 33 G/DL (ref 33–36)
MCV RBC AUTO: 93.6 FL (ref 80–94)
MONOCYTES # BLD AUTO: 0.44 X10(3)/MCL (ref 0.1–1.3)
MONOCYTES NFR BLD AUTO: 4.1 %
NEUTROPHILS # BLD AUTO: 8.99 X10(3)/MCL (ref 2.1–9.2)
NEUTROPHILS NFR BLD AUTO: 84.2 %
NRBC BLD AUTO-RTO: 0 %
PLATELET # BLD AUTO: 286 X10(3)/MCL (ref 130–400)
PMV BLD AUTO: 9.4 FL (ref 7.4–10.4)
RBC # BLD AUTO: 3.3 X10(6)/MCL (ref 4.7–6.1)
WBC # SPEC AUTO: 10.68 X10(3)/MCL (ref 4.5–11.5)

## 2023-11-28 PROCEDURE — 63600175 PHARM REV CODE 636 W HCPCS: Performed by: SURGERY

## 2023-11-28 PROCEDURE — 85025 COMPLETE CBC W/AUTO DIFF WBC: CPT | Performed by: SURGERY

## 2023-11-28 PROCEDURE — 97162 PT EVAL MOD COMPLEX 30 MIN: CPT

## 2023-11-28 PROCEDURE — 21400001 HC TELEMETRY ROOM

## 2023-11-28 PROCEDURE — 25000003 PHARM REV CODE 250: Performed by: SURGERY

## 2023-11-28 RX ORDER — OXYCODONE AND ACETAMINOPHEN 5; 325 MG/1; MG/1
1 TABLET ORAL EVERY 4 HOURS PRN
Status: DISCONTINUED | OUTPATIENT
Start: 2023-11-28 | End: 2023-11-29 | Stop reason: HOSPADM

## 2023-11-28 RX ORDER — ASPIRIN 81 MG/1
81 TABLET ORAL DAILY
Status: DISCONTINUED | OUTPATIENT
Start: 2023-11-28 | End: 2023-11-29 | Stop reason: HOSPADM

## 2023-11-28 RX ORDER — LOSARTAN POTASSIUM 50 MG/1
50 TABLET ORAL DAILY
Status: DISCONTINUED | OUTPATIENT
Start: 2023-11-28 | End: 2023-11-29 | Stop reason: HOSPADM

## 2023-11-28 RX ORDER — CLOPIDOGREL BISULFATE 75 MG/1
75 TABLET ORAL DAILY
Status: DISCONTINUED | OUTPATIENT
Start: 2023-11-28 | End: 2023-11-29 | Stop reason: HOSPADM

## 2023-11-28 RX ORDER — FINASTERIDE 5 MG/1
5 TABLET, FILM COATED ORAL DAILY
Status: DISCONTINUED | OUTPATIENT
Start: 2023-11-28 | End: 2023-11-29 | Stop reason: HOSPADM

## 2023-11-28 RX ORDER — CHOLECALCIFEROL (VITAMIN D3) 25 MCG
2000 TABLET ORAL DAILY
Status: DISCONTINUED | OUTPATIENT
Start: 2023-11-28 | End: 2023-11-29 | Stop reason: HOSPADM

## 2023-11-28 RX ORDER — ATORVASTATIN CALCIUM 40 MG/1
80 TABLET, FILM COATED ORAL NIGHTLY
Status: DISCONTINUED | OUTPATIENT
Start: 2023-11-28 | End: 2023-11-29 | Stop reason: HOSPADM

## 2023-11-28 RX ORDER — QUETIAPINE FUMARATE 100 MG/1
200 TABLET, FILM COATED ORAL NIGHTLY
Status: DISCONTINUED | OUTPATIENT
Start: 2023-11-28 | End: 2023-11-29 | Stop reason: HOSPADM

## 2023-11-28 RX ORDER — DULOXETIN HYDROCHLORIDE 30 MG/1
60 CAPSULE, DELAYED RELEASE ORAL DAILY
Status: DISCONTINUED | OUTPATIENT
Start: 2023-11-28 | End: 2023-11-29 | Stop reason: HOSPADM

## 2023-11-28 RX ORDER — METOPROLOL SUCCINATE 25 MG/1
25 TABLET, EXTENDED RELEASE ORAL DAILY
Status: DISCONTINUED | OUTPATIENT
Start: 2023-11-28 | End: 2023-11-29 | Stop reason: HOSPADM

## 2023-11-28 RX ADMIN — CLOPIDOGREL BISULFATE 75 MG: 75 TABLET ORAL at 01:11

## 2023-11-28 RX ADMIN — CEFAZOLIN SODIUM 2 G: 2 SOLUTION INTRAVENOUS at 08:11

## 2023-11-28 RX ADMIN — FINASTERIDE 5 MG: 5 TABLET, FILM COATED ORAL at 01:11

## 2023-11-28 RX ADMIN — DULOXETINE HYDROCHLORIDE 60 MG: 30 CAPSULE, DELAYED RELEASE ORAL at 01:11

## 2023-11-28 RX ADMIN — ATORVASTATIN CALCIUM 80 MG: 40 TABLET, FILM COATED ORAL at 09:11

## 2023-11-28 RX ADMIN — CEFAZOLIN SODIUM 2 G: 2 SOLUTION INTRAVENOUS at 12:11

## 2023-11-28 RX ADMIN — MUPIROCIN: 20 OINTMENT TOPICAL at 08:11

## 2023-11-28 RX ADMIN — MORPHINE SULFATE 3 MG: 4 INJECTION, SOLUTION INTRAMUSCULAR; INTRAVENOUS at 12:11

## 2023-11-28 RX ADMIN — METOPROLOL SUCCINATE 25 MG: 25 TABLET, EXTENDED RELEASE ORAL at 01:11

## 2023-11-28 RX ADMIN — SODIUM CHLORIDE: 9 INJECTION, SOLUTION INTRAVENOUS at 01:11

## 2023-11-28 RX ADMIN — ASPIRIN 81 MG: 81 TABLET, COATED ORAL at 01:11

## 2023-11-28 RX ADMIN — LOSARTAN POTASSIUM 50 MG: 50 TABLET, FILM COATED ORAL at 01:11

## 2023-11-28 RX ADMIN — MUPIROCIN: 20 OINTMENT TOPICAL at 12:11

## 2023-11-28 RX ADMIN — OXYCODONE HYDROCHLORIDE AND ACETAMINOPHEN 1 TABLET: 5; 325 TABLET ORAL at 09:11

## 2023-11-28 RX ADMIN — OXYCODONE HYDROCHLORIDE AND ACETAMINOPHEN 1 TABLET: 5; 325 TABLET ORAL at 01:11

## 2023-11-28 RX ADMIN — QUETIAPINE FUMARATE 200 MG: 100 TABLET ORAL at 09:11

## 2023-11-28 RX ADMIN — Medication 2000 UNITS: at 01:11

## 2023-11-28 NOTE — PLAN OF CARE
Problem: Physical Therapy  Goal: Physical Therapy Goal  Description: Goals to be met by: d/c     Patient will increase functional independence with mobility by performin. Sit to stand transfer with Supervision  2. Bed to chair transfer with Supervision using LR Assistive Device  3. Gait  x 300 feet with Stand-by Assistance using LR Assistive Device.     Outcome: Ongoing, Progressing

## 2023-11-28 NOTE — PT/OT/SLP EVAL
Physical Therapy Evaluation    Patient Name:  Deniz Pace   MRN:  [id number]    Recommendations:     Discharge therapy intensity: Low Intensity Therapy   Discharge Equipment Recommendations: none   Barriers to discharge: Ongoing medical needs    Assessment:     Deniz Pace is a 68 y.o. male admitted with a medical diagnosis of Critical limb ischemia of right lower extremity.  He presents with the following impairments/functional limitations: weakness, impaired functional mobility, impaired endurance, gait instability, impaired balance, decreased lower extremity function. Pt s/p R fem-pop bypass, with recent PMH of foot sx to correct hammer toe deformity. Pt states he is WBAT in R walking boot. Pt able to complete all household level mobilization CGA with RW. Pt required assistance to don boot due to groin pain from recent bypass. Will  3x/wk to progress mobilization while pt remains in acute care to improve endurance and safety prior to d/c.     Rehab Prognosis: Good; patient would benefit from acute skilled PT services to address these deficits and reach maximum level of function.    Recent Surgery: Procedure(s) (LRB):  CREATION, BYPASS, ARTERIAL, FEMORAL TO TIBIAL (Right)  ENDARTERECTOMY, FEMORAL (Right) 1 Day Post-Op    Plan:     During this hospitalization, patient to be seen 3 x/week to address the identified rehab impairments via gait training, therapeutic activities, therapeutic exercises and progress toward the following goals:    Plan of Care Expires:  12/30/23    Subjective     Chief Complaint: ready to get OOB  Patient/Family Comments/goals: to return to PLOF  Pain/Comfort:  Pain Rating 1: 5/10  Location - Side 1: Right  Location 1: leg    Patients cultural, spiritual, Jainism conflicts given the current situation:      Living Environment:  Pt lives alone in a 2nd story apt but has elevator entrance.  Prior to admission, patients level of function was I.  Equipment used at home: none.   DME owned (not currently used): rolling walker and single point cane.  Upon discharge, patient will have assistance from TBD.    Objective:     Communicated with RN prior to session.  Patient found HOB elevated with peripheral IV  upon PT entry to room.    General Precautions: Standard, fall  Orthopedic Precautions:N/A   Braces: N/A  Respiratory Status: Room air    Exams:  RLE Strength: N/T due to pain  LLE Strength: WFL  Skin integrity:  incisions covered with dressing, foot covered in gauze      Functional Mobility:  Bed Mobility:     Supine to Sit: stand by assistance  Transfers:     Sit to Stand:  stand by assistance with rolling walker  Bed to Chair: contact guard assistance with  no AD  using  Stand Pivot  Gait: Pt ambulated 150' with RW CGA prior to seated rest. No major LOB or safety concerns. Antalgic stepping pattern with boot donned but appropriate foot clearance.       AM-PAC 6 CLICK MOBILITY  Total Score:20       Treatment & Education:    Patient provided with verbal education education regarding PT role/goals/POC and fall prevention.  Understanding was verbalized.     Patient left up in chair with all lines intact, call button in reach, and RN notified.    GOALS:   Multidisciplinary Problems       Physical Therapy Goals          Problem: Physical Therapy    Goal Priority Disciplines Outcome Goal Variances Interventions   Physical Therapy Goal     PT, PT/OT Ongoing, Progressing     Description: Goals to be met by: d/c     Patient will increase functional independence with mobility by performin. Sit to stand transfer with Supervision  2. Bed to chair transfer with Supervision using LR Assistive Device  3. Gait  x 300 feet with Stand-by Assistance using LR Assistive Device.                          History:     Past Medical History:   Diagnosis Date    Arthritis     Atherosclerosis of bypass graft of right lower extremity with ulceration of other part of foot     Bipolar 1 disorder     Bladder  tumor     CAD (coronary artery disease)     Exertional dyspnea     Hepatitis C     treated in 2017    HLD (hyperlipidemia)     Hypertension     Lung mass 06/29/2016    right lung mass - Dr. Silverio Schroeder    MI (myocardial infarction)     Neuropathy     LEONID (obstructive sleep apnea)     uses CPAP    PVD (peripheral vascular disease)        Past Surgical History:   Procedure Laterality Date    COLONOSCOPY      CORONARY ARTERY BYPASS GRAFT  2013    CORONARY STENT PLACEMENT      CREATION OF FEMORAL-TIBIAL ARTERY BYPASS Right 11/27/2023    Procedure: CREATION, BYPASS, ARTERIAL, FEMORAL TO TIBIAL;  Surgeon: James Marx MD;  Location: [facility];  Service: Peripheral Vascular;  Laterality: Right;  vascular table    ENDARTERECTOMY OF FEMORAL ARTERY Right 11/27/2023    Procedure: ENDARTERECTOMY, FEMORAL;  Surgeon: James Marx MD;  Location: [facility];  Service: Peripheral Vascular;  Laterality: Right;    FEMORAL ARTERY STENT Left     HIP REPLACEMENT ARTHROPLASTY Left     INSERTION OF PACEMAKER      TOTAL KNEE ARTHROPLASTY Right     TOTAL SHOULDER ARTHROPLASTY Right        Time Tracking:     PT Received On: 11/28/23  PT Start Time: 1404     PT Stop Time: 1421  PT Total Time (min): 17 min     Billable Minutes: Evaluation 17      11/28/2023

## 2023-11-28 NOTE — PROGRESS NOTES
Vascular Surgery   Progress Note  Admit Date: 11/27/2023  HD#1  POD#1 Day Post-Op    Subjective:   Interval history:  No acute events overnight.  Afebrile.  Hemodynamically stable.  Urine output 1.2 L. has some pain in his groin as well as the below-knee incision.  No pain in his foot.  Hemoglobin is 10 this morning.    Home Meds:  Current Outpatient Medications   Medication Instructions    amoxicillin (AMOXIL) 875 mg, Oral, 2 times daily    aspirin (ECOTRIN) 81 mg, Oral, Daily    atorvastatin (LIPITOR) 80 mg, Oral, Nightly    cholecalciferol, vitamin D3, (VITAMIN D3) 50 mcg (2,000 unit) Tab 1 tablet, Oral, Daily    clopidogreL (PLAVIX) 75 mg, Oral, Daily    doxycycline (MONODOX) 100 mg, Oral, 2 times daily    DULoxetine (CYMBALTA) 60 mg, Oral, Daily    finasteride (PROSCAR) 5 mg, Oral, Daily    folic acid/multivit-min/lutein (CENTRUM SILVER ORAL) 1 tablet, Oral, Daily    furosemide (LASIX) 20 mg, Oral, Daily    losartan (COZAAR) 50 mg, Oral, Daily    metoprolol succinate (TOPROL-XL) 25 mg, Oral, Daily    mupirocin (BACTROBAN) 2 % ointment Topical (Top), 2 times daily    mupirocin calcium 2% (BACTROBAN) 2 % cream Topical (Top), 3 times daily    naproxen sodium (ANAPROX) 440 mg, Oral, 2 times daily PRN    naproxen-diphenhydrAMINE (ALEVE PM) 220-25 mg Tab 2 tablets, Oral, Nightly    nitrofurantoin, macrocrystal-monohydrate, (MACROBID) 100 MG capsule 100 mg, Oral, Nightly    oxyCODONE-acetaminophen (PERCOCET) 5-325 mg per tablet 1 tablet, Oral, Every 4 hours PRN    POLYETHYLENE GLYCOL 3350 (MIRALAX ORAL) 17 g, Oral, Every other day    QUEtiapine (SEROQUEL XR) 200 mg, Oral, Daily, 1 tablet by mouth at bedtime    silodosin (RAPAFLO) 8 mg, Oral, Daily    XARELTO 2.5 mg, Oral, 2 times daily      Scheduled Meds:   ceFAZolin (ANCEF) IVPB  2 g Intravenous Q8H    mupirocin   Nasal BID     Continuous Infusions:   sodium chloride 0.9% 70 mL/hr at 11/28/23 0102     PRN Meds:morphine, oxyCODONE-acetaminophen     Objective:  "    VITAL SIGNS: 24 HR MIN & MAX LAST   Temp  Min: 97.5 °F (36.4 °C)  Max: 98.4 °F (36.9 °C)  98.4 °F (36.9 °C)   BP  Min: 114/67  Max: 153/84  114/67    Pulse  Min: 59  Max: 82  71    Resp  Min: 14  Max: 20  20    SpO2  Min: 92 %  Max: 100 %  99 %      HT: 5' 8" (172.7 cm)  WT: 72 kg (158 lb 11.7 oz)  BMI: 24.1     Intake/output:  Intake/Output - Last 3 Shifts         11/26 0700 11/27 0659 11/27 0700 11/28 0659 11/28 0700 11/29 0659    IV Piggyback  1600     Total Intake(mL/kg)  1600 (22.2)     Urine (mL/kg/hr)  1275     Blood  350     Total Output  1625     Net  -25                    Intake/Output Summary (Last 24 hours) at 11/28/2023 0829  Last data filed at 11/28/2023 0458  Gross per 24 hour   Intake 1600 ml   Output 1625 ml   Net -25 ml           Lines/drains/airway:       Peripheral IV - Single Lumen 11/27/23 1154 18 G Anterior;Distal;Left Upper Arm (Active)   Site Assessment Clean;Dry;Intact;No redness;No swelling 11/28/23 0359   Extremity Assessment Distal to IV No abnormal discoloration;No redness;No swelling;No warmth 11/28/23 0000   Line Status Infusing 11/28/23 0359   Dressing Status Clean;Dry;Intact 11/28/23 0359   Dressing Intervention Integrity maintained 11/28/23 0000   Number of days: 0            Urethral Catheter 11/27/23 1900 Double-lumen;Silicone;Non-latex 16 Fr. (Active)   Site Assessment Clean;Intact 11/27/23 2330   Collection Container Standard drainage bag 11/27/23 2330   Securement Method secured to top of thigh w/ adhesive device 11/27/23 2330   Reason for Continuing Urinary Catheterization Post operative 11/27/23 2330   CAUTI Prevention Bundle Securement Device in place with 1" slack;No dependent loops or kinks;Drainage bag/urimeter not overfilled (<2/3 full);Drainage bag/urimeter off the floor;Intact seal between catheter & drainage tubing 11/27/23 2330   Output (mL) 500 mL 11/28/23 0458   Number of days: 0       Physical examination:  Gen: NAD, AAOx3, answering questions " "appropriately  HEENT: SHANEL  CV: RR  Resp: NWOB  Abd: S/NT/ND  Ext: moving all extremities spontaneously and purposefully.  Right groin incision island dressing in place without strike through.  Right below-knee incision dressing in place without strike through.  Palpable right posterior tibial artery.  Right dorsalis pedis signal is monophasic.  Right 2nd toe with some necrotic tissue laterally an erythematous tissue distally.  Third toe incision clean dry and intact.  Neuro: CN II-XII grossly intact    Labs:  Renal:  No results for input(s): "BUN", "CREATININE" in the last 72 hours.  No results for input(s): "LACTIC" in the last 72 hours.  FENGI:  No results for input(s): "NA", "K", "CL", "CO2", "CALCIUM", "MG", "PHOS", "PROT", "ALBUMIN", "BILITOT", "AST", "ALKPHOS", "ALT" in the last 72 hours.  Heme:  Recent Labs     11/28/23 0613   HGB 10.2*   HCT 30.9*        ID:  Recent Labs     11/28/23 0613   WBC 10.68     CBG:  No results for input(s): "GLUCOSE" in the last 72 hours.   Cardiovascular:  No results for input(s): "TROPONINI", "CKTOTAL", "CKMB", "BNP" in the last 168 hours.  ABG:  No results for input(s): "PH", "PO2", "PCO2", "HCO3", "BE" in the last 168 hours.   I have reviewed all pertinent lab results within the past 24 hours.    Imaging:  No orders to display      I have reviewed all pertinent imaging results/findings within the past 24 hours.    Micro/Path/Other:  Microbiology Results (last 7 days)       ** No results found for the last 168 hours. **           Specimen (168h ago, onward)       Start     Ordered    11/27/23 2006  Specimen to Pathology  RELEASE UPON ORDERING        References:    Click here for ordering Quick Tip   Question:  Release to patient  Answer:  Immediate    11/27/23 2006                     Assessment & Plan:   68-year-old male with a past medical history of coronary artery disease (status post CABG), myocardial infarction, obstructive sleep apnea, and peripheral vascular " disease with right lower extremity tissue loss.  He had undergone right SFA stenting but he has a chronic right popliteal occlusion.  He underwent a right femoral to posterior tibial artery bypass with 6 mm ringed PTFE on November 27, 2023     Posterior tibial artery is palpable, his right foot is warm.  Remove Chang today  Likely will get him up with physical therapy later today, will discuss with staff before doing so.  Leave island dressings in place to groin and below-knee incisions.  Wound Care is consulted for his right foot wounds  Likely restart aspirin and Plavix today, will discuss with staff.    Scar Persaud MD  General Surgery [facility]

## 2023-11-28 NOTE — ANESTHESIA POSTPROCEDURE EVALUATION
Anesthesia Post Evaluation    Patient: Deniz Pace    Procedure(s) Performed: Procedure(s) (LRB):  CREATION, BYPASS, ARTERIAL, FEMORAL TO TIBIAL (Right)  ENDARTERECTOMY, FEMORAL (Right)    Final Anesthesia Type: general      Patient location during evaluation: PACU  Patient participation: Yes- Able to Participate  Level of consciousness: awake  Post-procedure vital signs: reviewed and stable  Pain management: adequate  Airway patency: patent  LEONID mitigation strategies: Multimodal analgesia  PONV status at discharge: No PONV  Anesthetic complications: no      Cardiovascular status: stable  Respiratory status: unassisted  Hydration status: euvolemic  Follow-up not needed.          Vitals Value Taken Time   /77 11/28/23 1607   Temp 36.3 °C (97.3 °F) 11/28/23 1607   Pulse 68 11/28/23 1607   Resp 18 11/28/23 1300   SpO2 98 % 11/28/23 1607         Event Time   Out of Recovery 23:22:00         Pain/Sher Score: Pain Rating Prior to Med Admin: 8 (11/28/2023  1:02 AM)  Pain Rating Post Med Admin: 0 (11/28/2023  2:02 AM)  Sher Score: 10 (11/28/2023  8:00 AM)

## 2023-11-28 NOTE — NURSING
Nurses Note -- 4 Eyes      11/28/2023   2:19 AM      Skin assessed during: Admit      [] No Altered Skin Integrity Present    []Prevention Measures Documented      [x] Yes- Altered Skin Integrity Present or Discovered   [] LDA Added if Not in Epic (Describe Wound)   [] New Altered Skin Integrity was Present on Admit and Documented in LDA   [] Wound Image Taken    Wound Care Consulted? yes    Attending Nurse:  Bela ATKINSON    Second RN/Staff Member:   Flori Loving

## 2023-11-28 NOTE — PLAN OF CARE
11/28/23 1040   Discharge Assessment   Assessment Type Discharge Planning Assessment   Confirmed/corrected address, phone number and insurance Yes   Confirmed Demographics Correct on Facesheet   Source of Information patient   Communicated [place] with patient/caregiver Date not available/Unable to determine   Reason For Admission Procedure(s) Performed: Procedure(s) (LRB):  CREATION, BYPASS, ARTERIAL, FEMORAL TO TIBIAL (Right)  ENDARTERECTOMY, FEMORAL (Right)   People in Home alone   Facility Arrived From: Private residence.   Do you expect to return to your current living situation? Yes   Do you have help at home or someone to help you manage your care at home? Yes   Who are your caregiver(s) and their phone number(s)? Neli Sanchez (Sister) [phone] (Mobile)   Prior to hospitilization cognitive status: Alert/Oriented   Current cognitive status: Alert/Oriented   Walking or Climbing Stairs ambulation difficulty, requires equipment   Mobility Management The patient has a rolling walker; straight cane and crutches for use PRN.   Home Accessibility wheelchair accessible  (The patient lives in an apartment but has an elevator to access the second floor.)   Home Layout Bedroom on 2nd floor   Equipment Currently Used at Home walker, rolling;cane, straight;grab bar;CPAP;crutches   Readmission within 30 days? No   Patient currently being followed by outpatient case management? No   Do you currently have service(s) that help you manage your care at home? No   Do you take prescription medications? Yes   Do you have prescription coverage? Yes   Coverage Payor: AETNA MANAGED MEDICARE - AETNA MEDICARE PLAN PPO -   Do you have any problems affording any of your prescribed medications? No   Is the patient taking medications as prescribed? yes   Who is going to help you get home at discharge? Neli Sanchez (Sister) [phone] (Mobile)   How do you get to doctors appointments? health plan transportation   Are you on  Chief Complaint  Post-op Hernia    Subjective          Kennedy Lin presents to [facility] GENERAL SURGERY  History of Present Illness    Kennedy Lin is a 74 y.o. male  who presents today for a postoperative visit.     Patient is here for a follow-up after after a robotic repair of a right lower quadrant spit galea and hernia.  He is still pretty sore but is eating okay and going to the bathroom okay.    Past History:  Medical History: has a past medical history of Allergic rhinitis, Asthma, Broken bones, Coccygeal fracture (CMS/HCC), Diabetes mellitus, type 2 (CMS/HCC), Elevated hemoglobin A1c, Encounter for Medicare annual wellness exam, History of kidney stones, Hyperlipidemia, Hypertension, Lumbar vertebral fracture (CMS/HCC), Malignant melanoma of skin (CMS/[facility]), Obesity, Class I, BMI 30-34.9, Post-nasal drainage, Rash, Recurrent nephrolithiasis, Right rotator cuff tear, Tobacco chew use, Viral infection, and Vitamin D deficiency.   Surgical History: has a past surgical history that includes Rotator cuff repair; Inguinal hernia repair; and Shoulder surgery.   Family History: family history includes COPD in his mother; Diabetes in his maternal grandmother; Hypertension in his paternal grandfather; Other in his father and paternal grandfather.   Social History: reports that he has quit smoking. His smokeless tobacco use includes chew. He reports that he does not drink alcohol and does not use drugs.  Allergies: Patient has no known allergies.       Current Outpatient Medications:   •  atorvastatin (LIPITOR) 10 MG tablet, Take 1 tablet by mouth Daily., Disp: 90 tablet, Rfl: 1  •  glipizide (GLUCOTROL XL) 10 MG 24 hr tablet, Take 1 tablet by mouth Daily., Disp: 90 tablet, Rfl: 1  •  telmisartan (MICARDIS) 80 MG tablet, Take 1 tablet by mouth Daily., Disp: 90 tablet, Rfl: 1       Physical Exam  He does yet appear a little bit uncomfortable.  His incisions all look good his abdomen  dialysis? No   Do you take coumadin? No   DME Needed Upon Discharge  none   Discharge Plan discussed with: Patient   Transition of Care Barriers None   Discharge Plan A Home   Discharge Plan B Home with family   Social Connections   In a typical week, how many times do you talk on the phone with family, friends, or neighbors? Twice a week   How often do you get together with friends or relatives? Twice   How often do you attend Hinduism or Mormon services? More than 4   Do you belong to any clubs or organizations such as Hinduism groups, unions, fraternal or athletic groups, or school groups? 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 More than 4   Are you , , , , never , or living with a partner? Never marrie   Alcohol Use   Q1: How often do you have a drink containing alcohol? Never   Q2: How many drinks containing alcohol do you have on a typical day when you are drinking? None   Q3: How often do you have six or more drinks on one occasion? Never        otherwise is soft  Objective     Vital Signs:   There were no vitals taken for this visit.             Assessment and Plan    Diagnoses and all orders for this visit:    1. S/P spigelian hernia repair, follow-up exam (Primary)    We will see him back in the office in 2 weeks in [place].  I will prescribe him a abdominal binder for comfort.   I have asked him to call me if he has any further questions or problems.

## 2023-11-28 NOTE — TRANSFER OF CARE
"Anesthesia Transfer of Care Note    Patient: Deniz Pace    Procedure(s) Performed: Procedure(s) (LRB):  CREATION, BYPASS, ARTERIAL, FEMORAL TO TIBIAL (Right)  ENDARTERECTOMY, FEMORAL (Right)    Patient location: PACU    Anesthesia Type: general    Transport from OR: Transported from OR on room air with adequate spontaneous ventilation    Post pain: adequate analgesia    Post assessment: no apparent anesthetic complications    Post vital signs: stable    Level of consciousness: awake and alert    Nausea/Vomiting: no nausea/vomiting    Complications: none    Transfer of care protocol was followed      Last vitals: Visit Vitals  BP (!) 149/73 (BP Location: Right arm, Patient Position: Lying)   Pulse 62   Temp 36.5 °C (97.7 °F) (Skin)   Resp 15   Ht 5' 8" (1.727 m)   Wt 72 kg (158 lb 11.7 oz)   SpO2 99%   BMI 24.14 kg/m²     "

## 2023-11-28 NOTE — ANESTHESIA PROCEDURE NOTES
Intubation    Date/Time: 11/27/2023 7:01 PM    Performed by: Jimena Gomes CRNA  Authorized by: Erik Whitaker MD    Intubation:     Induction:  Intravenous    Intubated:  Postinduction    Mask Ventilation:  Easy mask    Attempts:  1    Attempted By:  CRNA    Method of Intubation:  Direct    Blade:  [place] 2    Laryngeal View Grade: Grade I - full view of cords      Difficult Airway Encountered?: No      Complications:  None    Airway Device:  Oral endotracheal tube    Airway Device Size:  7.5    Style/Cuff Inflation:  Cuffed (inflated to minimal occlusive pressure)    Inflation Amount (mL):  5    Tube secured:  23    Secured at:  The lips    Placement Verified By:  Capnometry    Complicating Factors:  None    Findings Post-Intubation:  BS equal bilateral and atraumatic/condition of teeth unchanged

## 2023-11-28 NOTE — NURSING
"[facility] Medical Telemetry  Wound Care    Patient Name:  Deniz Pace   MRN:  [id number]  Date: 11/28/2023  Diagnosis: Critical limb ischemia of right lower extremity    History:     Past Medical History:   Diagnosis Date    Arthritis     Atherosclerosis of bypass graft of right lower extremity with ulceration of other part of foot     Bipolar 1 disorder     Bladder tumor     CAD (coronary artery disease)     Exertional dyspnea     Hepatitis C     treated in 2017    HLD (hyperlipidemia)     Hypertension     Lung mass 06/29/2016    right lung mass - Dr. Silverio Schroeder    MI (myocardial infarction)     Neuropathy     LEONID (obstructive sleep apnea)     uses CPAP    PVD (peripheral vascular disease)        Social History     Socioeconomic History    Marital status: Single   Tobacco Use    Smoking status: Never    Smokeless tobacco: Never   Substance and Sexual Activity    Alcohol use: Not Currently     Comment: "recovering alcoholic"    Drug use: Never     Social Determinants of Health     Social Connections: Moderately Integrated (11/28/2023)    Social Connection and Isolation Panel [NHANES]     Frequency of Communication with Friends and Family: Twice a week     Frequency of Social Gatherings with Friends and Family: Twice a week     Attends Pentecostal Services: More than 4 times per year     Active Member of Clubs or Organizations: Yes     Attends Club or Organization Meetings: More than 4 times per year     Marital Status: Never        Precautions:     Allergies as of 11/20/2023 - Reviewed 11/20/2023   Allergen Reaction Noted    Lithium analogues Other (See Comments) 09/19/2014       [facility] Assessment Details/Treatment   Consulted for right foot. Patient states Dr. Ángel Mejia did surgery on his right foot November 5th and he has been following up with Dr. Mejia. States he thinks he is aware of hospitalization. Patient with surgical incision to toes 2 thru 5. Sutures intact. Right 2nd " toe with eschar. Wound beds cleansed with vash, 2nd toe eschar painted with betadine, 3rd to incision adaptic applied, covered with gauze, wrapped with kerlix, secured with cloth tape. Tolerated well.     11/28/23 1005   WOCN Assessment   WOCN Total Time (mins) 45   Visit Date 11/28/23   Visit Time 1005   Consult Type New   Wound arterial   Number of Wounds 4   Intervention assessed;changed;applied;chart review;orders   Skin Interventions   Pressure Reduction Techniques heels elevated off bed   Positioning   Body Position position changed independently   Head of Bed (HOB) Positioning HOB at 30 degrees        Altered Skin Integrity 11/28/23 0940 Right anterior Toe, third Other (comment)   Date First Assessed/Time First Assessed: 11/28/23 0940   Altered Skin Integrity Present on Admission - Did Patient arrive to the hospital with altered skin?: yes  Side: Right  Orientation: anterior  Location: Toe, third  Primary Wound Type: Other (com...   Wound Image    Dressing Appearance Intact   Drainage Amount Scant   Drainage Characteristics/Odor Clear;Serous   Appearance Intact  (10 sutures in place)   Tissue loss description Full thickness   Periwound Area Intact   Wound Edges Defined   Wound Length (cm) 5 cm  (10 sutures intact)   Wound Width (cm) 0.2 cm   Wound Depth (cm) 0.2 cm   Wound Volume (cm^3) 0.2 cm^3   Wound Surface Area (cm^2) 1 cm^2   Care Wound cleanser  (Vashe)   Dressing Applied  (Adaptic, gauze, kerlix, cloth tape)        Altered Skin Integrity 11/28/23 0940 Right dorsal Toe, fourth Other (comment)   Date First Assessed/Time First Assessed: 11/28/23 0940   Altered Skin Integrity Present on Admission - Did Patient arrive to the hospital with altered skin?: yes  Side: Right  Orientation: dorsal  Location: Toe, fourth  Primary Wound Type: Other (comm...   Wound Image    Dressing Appearance Intact   Drainage Amount None   Appearance Intact  (one suture in place)   Periwound Area Intact   Wound Edges Defined    Wound Length (cm) 0.5 cm  (one suture inplace)   Wound Width (cm) 0.2 cm   Wound Depth (cm) 0.1 cm   Wound Volume (cm^3) 0.01 cm^3   Wound Surface Area (cm^2) 0.1 cm^2   Care Applied:   Dressing Applied  (gauze, kerlix, cloth tape)        Altered Skin Integrity 11/28/23 0940 Right dorsal Toe, fifth Other (comment)   Date First Assessed/Time First Assessed: 11/28/23 0940   Altered Skin Integrity Present on Admission - Did Patient arrive to the hospital with altered skin?: yes  Side: Right  Orientation: dorsal  Location: Toe, fifth  Primary Wound Type: Other (comment)   Wound Image    Dressing Appearance Intact   Drainage Amount None   Drainage Characteristics/Odor Clear;Serous   Appearance Smooth   Tissue loss description Full thickness   Periwound Area Intact   Wound Edges Dehisced   Wound Length (cm) 0.5 cm  (one suture in place)   Wound Width (cm) 0.2 cm   Wound Depth (cm) 0.1 cm   Wound Volume (cm^3) 0.01 cm^3   Wound Surface Area (cm^2) 0.1 cm^2        Altered Skin Integrity 11/28/23 1005 Right anterior Toe, second Other (comment)   Date First Assessed/Time First Assessed: 11/28/23 1005   Altered Skin Integrity Present on Admission - Did Patient arrive to the hospital with altered skin?: yes  Side: Right  Orientation: anterior  Location: Toe, second  Primary Wound Type: Other (co...   Wound Image    Dressing Appearance Intact   Drainage Amount Small   Drainage Characteristics/Odor Clear;Serous   Appearance Eschar;Slough   Tissue loss description Full thickness   Black (%), Wound Tissue Color 80 %   Red (%), Wound Tissue Color 20 %   Periwound Area Intact   Wound Edges Defined   Wound Length (cm) 5 cm   Wound Width (cm) 3.5 cm   Wound Depth (cm) 0.2 cm   Wound Volume (cm^3) 3.5 cm^3   Wound Surface Area (cm^2) 17.5 cm^2   Care Wound cleanser   Dressing Applied  (Painted with betadine, covered with gauze, wrapped with kerlix, secured with cloth tape.)       Recommendations made to primary team for wound care to  toe #2 paint with betadine, toe #3 cover incision with adaptic, toe 4 & 5 cleanse with vashe, cover with gauze, wrape with kerlix. Heel boot ordered, to be worn at all times while in bed . Orders placed.     11/28/2023

## 2023-11-28 NOTE — OP NOTE
[facility]  Operative Note        Surgery Date:  11/27/2023     Pre-op Diagnosis:    Right lower extremity critical limb ischemia     Post-op Diagnosis:  Same     Procedure(s):  Right femoral to posterior tibial artery bypass using 6 mm ringed PTFE    Indication for procedure: Deniz Pace is a 68 y.o. male who presented with a nonhealing wound after right 2nd and 3rd hammertoe repairs.  Had he would a known popliteal occlusion.  Attempts have been made to revascularize this endovascularly, however unsuccessfully.  He was taken for femoral to tibial bypass for limb salvage    Surgeon:  James Marx MD    Assistant:  Lopez Persaud MD Resident  Circulator: Candace Chisholm RN  Relief Scrub: Karo Kirk ST  Scrub Person: Fazal Razo     Anesthesia:  General     Findings: Good doppler signal in the PT on completion of the case    Complications: None     Estimated Blood Loss: 300 mL         Specimens: None    Implants:  Implant Name Type Inv. Item Serial No.  Lot No. LRB No. Used Action   GRAFT VAS PROPATEN 6X80 TWR - [id number]  GRAFT VAS PROPATEN 6X80 TWR [id number] [employer]  Right 1 Implanted       Drains: None    Procedure in detail:  After informed consent was obtained, and risks and benefits discussed with the patient, he was brought to the operating room placed supine.  After adequate general anesthesia was obtained, he was prepped and draped in a standard sterile fashion.  An oblique incision was made in the right groin and dissection carried down until the common femoral, profunda, and SFA were all identified.  These were isolated with vessel loops.  Attention was turned to the lower leg.  A below-knee incision was made in the medial leg and dissection carried down until the popliteal fossa was identified.  Dissection continued in a caudal direction.  Once the posterior tibial artery was identified dissection continued until a soft segment of artery was encountered which  I felt was adequate for anastomosis.  The patient had a heavily calcified proximal posterior tibial as well as TP trunk and popliteal.  The common femoral was also heavily calcified.  A tunneling device was used to tunnel between the 2 incisions and a 6 mm ringed PTFE graft was passed.  The patient was then systemically heparinized.  Common femoral artery, profunda, and SFA were all clamped.  A longitudinal arteriotomy was made in the common femoral artery.  I did perform an endarterectomy on the common femoral artery as it was heavily calcified.  The PTFE was then spatulated and an end-to-side anastomosis was performed with CV 6 Rudd-Alfa suture.  Once this was completed flow was restored of the leg and the graft was flushed.  Attention was then turned to the lower leg.  The posterior tibial artery was brought under tension of vessel loops.  A longitudinal arteriotomy was made and the graft cut to length and spatulated.  Six 0 Prolene suture was used to perform a distal anastomosis.  This was all flushed prior to the completion of the anastomosis.  At this point flow was restored of the foot.  Doppler signals were identified which augmented with the graft open and were decreased to a monophasic flow with the graft pinched.  Protamine was then given in the wounds were made hemostatic.  The groin was closed using 2-0 for 1 deep layer, 3-0 for 2 more deep layers, and a 4-0 Monocryl subcuticular on the skin.  The lower leg incision was closed using a 2-0 Vicryl for a deep layer and skin staples.  Dressings were placed, and the patient was awakened and brought to recovery in stable condition.    Condition: Good    I performed the procedure.

## 2023-11-29 VITALS
HEIGHT: 68 IN | DIASTOLIC BLOOD PRESSURE: 74 MMHG | WEIGHT: 158.75 LBS | SYSTOLIC BLOOD PRESSURE: 117 MMHG | TEMPERATURE: 98 F | RESPIRATION RATE: 20 BRPM | BODY MASS INDEX: 24.06 KG/M2 | HEART RATE: 60 BPM | OXYGEN SATURATION: 99 %

## 2023-11-29 LAB — PSYCHE PATHOLOGY RESULT: NORMAL

## 2023-11-29 PROCEDURE — 25000003 PHARM REV CODE 250: Performed by: SURGERY

## 2023-11-29 PROCEDURE — 94761 N-INVAS EAR/PLS OXIMETRY MLT: CPT

## 2023-11-29 RX ORDER — OXYCODONE AND ACETAMINOPHEN 5; 325 MG/1; MG/1
1 TABLET ORAL EVERY 4 HOURS PRN
Qty: 15 TABLET | Refills: 0 | Status: SHIPPED | OUTPATIENT
Start: 2023-11-29 | End: 2023-12-20

## 2023-11-29 RX ADMIN — CLOPIDOGREL BISULFATE 75 MG: 75 TABLET ORAL at 09:11

## 2023-11-29 RX ADMIN — FINASTERIDE 5 MG: 5 TABLET, FILM COATED ORAL at 09:11

## 2023-11-29 RX ADMIN — ASPIRIN 81 MG: 81 TABLET, COATED ORAL at 09:11

## 2023-11-29 RX ADMIN — DULOXETINE HYDROCHLORIDE 60 MG: 30 CAPSULE, DELAYED RELEASE ORAL at 09:11

## 2023-11-29 RX ADMIN — METOPROLOL SUCCINATE 25 MG: 25 TABLET, EXTENDED RELEASE ORAL at 09:11

## 2023-11-29 RX ADMIN — LOSARTAN POTASSIUM 50 MG: 50 TABLET, FILM COATED ORAL at 09:11

## 2023-11-29 RX ADMIN — MUPIROCIN: 20 OINTMENT TOPICAL at 09:11

## 2023-11-29 RX ADMIN — Medication 2000 UNITS: at 09:11

## 2023-11-29 NOTE — CONSULTS
[facility]  Podiatry  Consult Note    Patient Name: Deniz Pace  MRN: [id number]  Admission Date: 11/27/2023  Hospital Length of Stay: 1 days  Attending Physician: James Marx MD  Primary Care Provider: Hipolito Lewis MD     Inpatient consult to Podiatry  Consult performed by: Ángel Mejia DPM  Consult ordered by: James Marx MD        Subjective:     History of Present Illness:     68-year-old male with a past medical history of coronary artery disease (status post CABG), myocardial infarction, obstructive sleep apnea, and peripheral vascular disease with right lower extremity tissue loss.      I performed hammertoe corrections toes 2 and 3 and flexor tenotomy's toes 4 and 5 approximately 3 weeks ago.  He developed ischemia and was trending towards gangrene at the right second toe after surgery.    He had undergone right SFA stenting but he has a chronic right popliteal occlusion.      He was admitted for and underwent a right femoral to posterior tibial artery bypass with 6 mm ringed PTFE on November 27, 2023 with Dr. Marx.     Resting comfortably this evening in bed.  Mild pain at surgical site in foot controlled with p.o. medication.    Denies fevers or chills. Denies calf pain or swelling.  No increased pain at the toe.      Scheduled Meds:   aspirin  81 mg Oral Daily    atorvastatin  80 mg Oral QHS    clopidogreL  75 mg Oral Daily    DULoxetine  60 mg Oral Daily    finasteride  5 mg Oral Daily    losartan  50 mg Oral Daily    metoprolol succinate  25 mg Oral Daily    mupirocin   Nasal BID    QUEtiapine  200 mg Oral QHS    vitamin D  2,000 Units Oral Daily     Continuous Infusions:  PRN Meds:morphine, oxyCODONE-acetaminophen, oxyCODONE-acetaminophen    Review of patient's allergies indicates:   Allergen Reactions    Lithium analogues Other (See Comments)     coma        Past Medical History:   Diagnosis Date    Arthritis     Atherosclerosis  "of bypass graft of right lower extremity with ulceration of other part of foot     Bipolar 1 disorder     Bladder tumor     CAD (coronary artery disease)     Exertional dyspnea     Hepatitis C     treated in 2017    HLD (hyperlipidemia)     Hypertension     Lung mass 06/29/2016    right lung mass - Dr. Silverio Schroeder    MI (myocardial infarction)     Neuropathy     LEONID (obstructive sleep apnea)     uses CPAP    PVD (peripheral vascular disease)      Past Surgical History:   Procedure Laterality Date    COLONOSCOPY      CORONARY ARTERY BYPASS GRAFT  2013    CORONARY STENT PLACEMENT      CREATION OF FEMORAL-TIBIAL ARTERY BYPASS Right 11/27/2023    Procedure: CREATION, BYPASS, ARTERIAL, FEMORAL TO TIBIAL;  Surgeon: James Marx MD;  Location: [facility] OR;  Service: Peripheral Vascular;  Laterality: Right;  vascular table    ENDARTERECTOMY OF FEMORAL ARTERY Right 11/27/2023    Procedure: ENDARTERECTOMY, FEMORAL;  Surgeon: James Marx MD;  Location: [facility] OR;  Service: Peripheral Vascular;  Laterality: Right;    FEMORAL ARTERY STENT Left     HIP REPLACEMENT ARTHROPLASTY Left     INSERTION OF PACEMAKER      TOTAL KNEE ARTHROPLASTY Right     TOTAL SHOULDER ARTHROPLASTY Right        Family History    None       Tobacco Use    Smoking status: Never    Smokeless tobacco: Never   Substance and Sexual Activity    Alcohol use: Not Currently     Comment: "recovering alcoholic"    Drug use: Never    Sexual activity: Not on file     Review of Systems  Objective:     Vital Signs (Most Recent):  Temp: 97.8 °F (36.6 °C) (11/28/23 2000)  Pulse: 73 (11/28/23 2000)  Resp: 18 (11/28/23 2125)  BP: 121/67 (11/28/23 2000)  SpO2: 97 % (11/28/23 2000) Vital Signs (24h Range):  Temp:  [97.3 °F (36.3 °C)-98.4 °F (36.9 °C)] 97.8 °F (36.6 °C)  Pulse:  [64-80] 73  Resp:  [14-20] 18  SpO2:  [92 %-99 %] 97 %  BP: (114-148)/(67-78) 121/67     Weight: 72 kg (158 lb 11.7 oz)  Body mass index is 24.14 kg/m².    Foot " "Exam    General  Orientation: alert and oriented to person, place, and time   Affect: appropriate       Right Foot/Ankle     Inspection and Palpation  Skin Exam: skin changes, abnormal color and ulcer;     Neurovascular  Posterior tibial: 2+    Comments  Wound with dry eschar base at lateral half of right second toe. Minimal edema.  No drainage at this time.  No odor.Toe is warmer compared to before bypass.  CRF time still a little blunted compared to adjacent toes but encouraged with improvement.No tenderness.  No signs of cellulitis fluctuance or crepitus.Sutures intact second and third toe with no dehiscence.          Laboratory:  A1C: No results for input(s): "HGBA1C" in the last 4320 hours.  BMP: No results for input(s): "GLU", "NA", "K", "CL", "CO2", "BUN", "CREATININE", "CALCIUM", "MG" in the last 168 hours.  Cardiac Markers: No results for input(s): "CKMB", "TROPONINT", "MYOGLOBIN" in the last 168 hours.  CBC:   Recent Labs   Lab 11/28/23  0613   WBC 10.68   RBC 3.30*   HGB 10.2*   HCT 30.9*      MCV 93.6   MCH 30.9   MCHC 33.0     CRP: No results for input(s): "CRP" in the last 168 hours.  ESR: No results for input(s): "SEDRATE" in the last 168 hours.  Prealbumin: No results for input(s): "PREALBUMIN" in the last 48 hours.      Assessment/Plan:     Active Diagnoses:    Diagnosis Date Noted POA    PRINCIPAL PROBLEM:  Critical limb ischemia of right lower extremity [I70.221] 11/27/2023 Yes      Problems Resolved During this Admission:     Wound at right second toe, dry eschar.  No signs of infection.    Right forefoot perfusion improved 1 day status post fem - posterior tibial bypass.      Reviewed options going forward for the second toe wound with consideration for amputation versus wound care and observation for the next week.    He states he has been thinking about it and would like to continue with wound care for the next week to see if the improved circulation will allow the toe to heal. He " understands that the toe may require amputation if further gangrene progresses or the toe becomes infected.    Appreciate heroic, limb saving, lower extremity revascularization.     Dressing change every 1-3 days with Betadine, Adaptic, gauze.  Offload with surgical shoe or boot. Rest.     He can follow-up with me next week for wound check and suture removal.  Will schedule for outpatient amputation as needed.          Thank you for your consult. I will sign off. Please contact us if you have any additional questions.    Ángel Mejia DPM  Podiatry  [facility]

## 2023-11-29 NOTE — PLAN OF CARE
Problem: Adult Inpatient Plan of Care  Goal: Plan of Care Review  Outcome: Met  Goal: Patient-Specific Goal (Individualized)  Outcome: Met  Goal: Absence of Hospital-Acquired Illness or Injury  Outcome: Met  Goal: Optimal Comfort and Wellbeing  Outcome: Met  Goal: Readiness for Transition of Care  Outcome: Met     Problem: Fall Injury Risk  Goal: Absence of Fall and Fall-Related Injury  Outcome: Met     Problem: Impaired Wound Healing  Goal: Optimal Wound Healing  Outcome: Met

## 2023-11-29 NOTE — NURSING
Tele was removed and returned to the box and the IV was removed. The patient was educated on admit diagnosis, new/changed/stopped medications, follow up appointment times and dates, and when to return to the ed. He verbalized understanding of teaches and has no further questions. I specifically outlined the wound care that was put in the discharge instructions. I called the [facility] transportation services as per the patient. We are currently waiting for their arrival for discharge to home. The patient had DME (walker) and Rx delivered to his room.

## 2023-11-29 NOTE — NURSING
The [employer] transport was canceled per the patients request. He has a friend picking him up. He was wheeled down to the ED by the  to meet his friend to be transported home. He is medically stable upon discharge.

## 2023-11-29 NOTE — DISCHARGE SUMMARY
[facility]  Vascular Surgery  Discharge Summary      Patient Name: eDniz Pace  MRN: [id number]  Admission Date: 11/27/2023  Hospital Length of Stay: 2 days  Discharge Date and Time:  11/29/2023 9:09 AM  Attending Physician: James Marx MD   Discharging Provider: James Marx MD  Primary Care Provider: Hipolito Lewis MD    HPI:   No notes on file    Procedure(s) (LRB):  CREATION, BYPASS, ARTERIAL, FEMORAL TO TIBIAL (Right)  ENDARTERECTOMY, FEMORAL (Right)     Hospital Course:  Patient was admitted from home, and had the above procedure performed.  Please see the dictation of that procedure for further information regarding it.  Postoperatively he was transferred to the floor.  On postoperative day 1. He was hemodynamically stable in his incisions were intact.  He would a strong pulse in the right foot.  He began to be ambulated.  By postop day 2., his pain was well-controlled and he was deemed stable for discharge.    Goals of Care Treatment Preferences:         Consults:   Consults (From admission, onward)          Status Ordering Provider     Inpatient consult to Podiatry  Once        Provider:  Ángel Mejia DPM    Completed JAMES MARX            Significant Diagnostic Studies: N/A    Pending Diagnostic Studies:       Procedure Component Value Units Date/Time    Specimen to Pathology [[id number]] Collected: 11/27/23 2006    Order Status: Sent Lab Status: In process Updated: 11/28/23 1000    Specimen: Tissue from Leg, Right; Tissue from Groin           Final Active Diagnoses:    Diagnosis Date Noted POA    PRINCIPAL PROBLEM:  Critical limb ischemia of right lower extremity [I70.221] 11/27/2023 Yes      Problems Resolved During this Admission:      Discharged Condition: good    Disposition: Home or Self Care    Follow Up:   Follow-up Information       Ángel Mejia DPM Follow up in 1 week(s).    Specialty: Podiatry  Contact information:  [address] [place] [address]  [phone]               James Marx MD Follow up in 2 week(s).    Specialties: Vascular Surgery, Vascular Medicine  Contact information:  [address] [place] [address]  [phone]                             Patient Instructions:      Change dressing (specify)   Order Comments: May remove Aquacel dressings in 4 days.  After that, Okay to leave lower leg incision on covered, Keep dry gauze to groin until fully healed.     Medications:  Reconciled Home Medications:      Medication List        CONTINUE taking these medications      ALEVE -25 mg Tab  Generic drug: naproxen-diphenhydrAMINE  Take 2 tablets by mouth every evening.     aspirin 81 MG EC tablet  Commonly known as: ECOTRIN  Take 81 mg by mouth once daily.     atorvastatin 80 MG tablet  Commonly known as: LIPITOR  Take 80 mg by mouth every evening.     CENTRUM SILVER ORAL  Take 1 tablet by mouth once daily.     cholecalciferol (vitamin D3) 50 mcg (2,000 unit) Tab  Commonly known as: VITAMIN D3  Take 1 tablet by mouth once daily.     clopidogreL 75 mg tablet  Commonly known as: PLAVIX  Take 75 mg by mouth once daily.     DULoxetine 60 MG capsule  Commonly known as: CYMBALTA  Take 60 mg by mouth once daily.     finasteride 5 mg tablet  Commonly known as: PROSCAR  Take 5 mg by mouth once daily.     losartan 50 MG tablet  Commonly known as: COZAAR  Take 50 mg by mouth once daily.     metoprolol succinate 25 MG 24 hr tablet  Commonly known as: TOPROL-XL  Take 25 mg by mouth once daily.     MIRALAX ORAL  Take 17 g by mouth every other day.     mupirocin 2 % ointment  Commonly known as: BACTROBAN  Apply topically 2 (two) times daily.     mupirocin calcium 2% 2 % cream  Commonly known as: BACTROBAN  Apply topically 3 (three) times daily.     naproxen sodium 220 MG tablet  Commonly known as: ANAPROX  Take 440 mg by mouth 2 (two) times daily as needed (pain).     oxyCODONE-acetaminophen 5-325  mg per tablet  Commonly known as: PERCOCET  Take 1 tablet by mouth every 4 (four) hours as needed for Pain.     QUEtiapine 200 MG Tb24  Commonly known as: SEROQUEL XR  Take 200 mg by mouth once daily. 1 tablet by mouth at bedtime     silodosin 8 mg Cap capsule  Commonly known as: RAPAFLO  Take 8 mg by mouth once daily.     XARELTO 2.5 mg Tab  Generic drug: rivaroxaban  Take 2.5 mg by mouth 2 (two) times daily.            STOP taking these medications      amoxicillin 875 MG tablet  Commonly known as: AMOXIL     doxycycline 100 MG capsule  Commonly known as: MONODOX     furosemide 20 MG tablet  Commonly known as: LASIX     nitrofurantoin (macrocrystal-monohydrate) 100 MG capsule  Commonly known as: MACROBID              James Marx MD  Vascular Surgery  [facility]

## 2023-11-29 NOTE — PLAN OF CARE
Received a case management consult to have a rolling walker delivered prior to discharge home. I contacted Mitch ([facility] DME Representative) who states he does have a rolling walker in stock and it will be delivered to his room before discharge home today.

## 2023-12-01 ENCOUNTER — TELEPHONE (OUTPATIENT)
Dept: VASCULAR SURGERY | Facility: CLINIC | Age: 68
End: 2023-12-01
Payer: MEDICARE

## 2023-12-01 LAB
ABO + RH BLD: NORMAL
ABO + RH BLD: NORMAL
BLD PROD TYP BPU: NORMAL
BLD PROD TYP BPU: NORMAL
BLOOD UNIT EXPIRATION DATE: NORMAL
BLOOD UNIT EXPIRATION DATE: NORMAL
BLOOD UNIT TYPE CODE: 9500
BLOOD UNIT TYPE CODE: 9500
CROSSMATCH INTERPRETATION: NORMAL
CROSSMATCH INTERPRETATION: NORMAL
DISPENSE STATUS: NORMAL
DISPENSE STATUS: NORMAL
UNIT NUMBER: NORMAL
UNIT NUMBER: NORMAL

## 2023-12-01 NOTE — TELEPHONE ENCOUNTER
Deniz Pace is s/p right leg bypass on 11/27/2023. Attempted to call patient to obtain post operative status, but was unsuccessful. Left a voicemail to contact the office at their earliest convenience.  Follow up appointment information and office contact were also provided.     Call returned. He denied signs or symptoms of infection to right groin and leg incision. His dressing remains in place.He denies lower extremity swelling. States ambulating well with improvement in leg symptoms. Follow up information was confirmed (12/13/23). He was advised to call with any changes or concerns. He states understanding.

## 2023-12-11 NOTE — PROGRESS NOTES
"    [facility] - Clinic Note  James Marx MD      Patient Name: Deniz Pace                   : 1955      MRN: [id number]   Visit Date: 2023       History Present Illness     Reason for Visit: Post-operative Follow up     Mr. Pace presents to the clinic for 2 week incision check. He is s/p right [id number]-uwmern bypass 23 secondary to right foot wounds. He denies fevers, drainage from the right groin incision, worsening pain or swelling.  He has some right lower leg swelling but this is improving. He states his toe wounds are slowly improving. He has followed up with Dr. Mejia since the surgery.        REVIEW OF SYSTEMS:  12 point review of systems conducted, negative except as stated in the history of present illness. See HPI for details.        Physical Exam      Vitals:    23 0904 23 0905   BP: 127/68 137/67   BP Location: Right arm Left arm   Pulse: 62 62   Weight:  71.7 kg (158 lb)   Height:  5' 8" (1.727 m)          General: alert, pleasant, conversant, and oriented, ambulating normally, alert, pleasant, conversant, and oriented  Neurologic: cranial nerves are grossly intact, no neurologic deficits, no motor deficits, and no sensory deficits  HEENT: grossly normal and no scleral icterus  Neck/Chest: normal   Respiratory: breathing easily and without respiratory distress  Abdomen: normal  Cardiology: regular rate and rhythm and no audible murmur    Lower Extremity Arterial Exam:  Right - posterior tibial is triphasic with doppler and dorsalis pedis is biphasic with doppler    Musculoskeletal:   Lower Extremity: right lower extremity has pitting edema    Right 3rd toe appears much better with no further signs of ischemia.  Left toe has some wounds and some lateral ischemia, however there is excellent bleeding from the granulation tissue present.    Right lower leg with 1+ pitting edema.  The staples overlying the incision are intact in the incision is healing " well.  His right groin incision is well healed.  There are 2 vein branch ligation sites that are healing well        Assessment and Plan     Mr. Pace is a 68 y.o. male status post right femoral to posterior tibial bypass for nonhealing wounds.  He was doing well.  I would like him to return in a week for staple removal.  His swelling is not insignificant in the left lower extremity and I would like him to have another week before we attempt to take the lower leg staples out.      1. 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 S/P femoral-tibial bypass          Imaging Obtained/Reviewed   Study: none  Date:           Medical History     Past Medical History:   Diagnosis Date    Arthritis     Atherosclerosis of bypass graft of right lower extremity with ulceration of other part of foot     Bipolar 1 disorder     Bladder tumor     CAD (coronary artery disease)     Exertional dyspnea     Hepatitis C     treated in 2017    HLD (hyperlipidemia)     Hypertension     Lung mass 06/29/2016    right lung mass - Dr. Silverio Schroeder    MI (myocardial infarction)     Neuropathy     LEONID (obstructive sleep apnea)     uses CPAP    PVD (peripheral vascular disease)      Past Surgical History:   Procedure Laterality Date    COLONOSCOPY      CORONARY ARTERY BYPASS GRAFT  2013    CORONARY STENT PLACEMENT      CREATION OF FEMORAL-TIBIAL ARTERY BYPASS Right 11/27/2023    Procedure: CREATION, BYPASS, ARTERIAL, FEMORAL TO TIBIAL;  Surgeon: James Marx MD;  Location: [facility] OR;  Service: Peripheral Vascular;  Laterality: Right;  vascular table    ENDARTERECTOMY OF FEMORAL ARTERY Right 11/27/2023    Procedure: ENDARTERECTOMY, FEMORAL;  Surgeon: James Marx MD;  Location: [facility] OR;  Service: Peripheral Vascular;  Laterality: Right;    FEMORAL ARTERY STENT Left     HIP REPLACEMENT ARTHROPLASTY Left     INSERTION OF PACEMAKER      TOTAL KNEE ARTHROPLASTY Right     TOTAL  "SHOULDER ARTHROPLASTY Right      History reviewed. No pertinent family history.  Social History     Socioeconomic History    Marital status: Single   Tobacco Use    Smoking status: Never    Smokeless tobacco: Never   Substance and Sexual Activity    Alcohol use: Not Currently     Comment: "recovering alcoholic"    Drug use: Never     Social Determinants of Health     Social Connections: Moderately Integrated (11/28/2023)    Social Connection and Isolation Panel [NHANES]     Frequency of Communication with Friends and Family: Twice a week     Frequency of Social Gatherings with Friends and Family: Twice a week     Attends [employer] Services: More than 4 times per year     Active Member of Clubs or Organizations: Yes     Attends Club or Organization Meetings: More than 4 times per year     Marital Status: Never      Current Outpatient Medications   Medication Instructions    aspirin (ECOTRIN) 81 mg, Oral, Daily    atorvastatin (LIPITOR) 80 mg, Oral, Nightly    cholecalciferol, vitamin D3, (VITAMIN D3) 50 mcg (2,000 unit) Tab 1 tablet, Oral, Daily    clopidogreL (PLAVIX) 75 mg, Oral, Daily    DULoxetine (CYMBALTA) 60 mg, Oral, Daily    finasteride (PROSCAR) 5 mg, Oral, Daily    folic acid/multivit-min/lutein (CENTRUM SILVER ORAL) 1 tablet, Oral, Daily    losartan (COZAAR) 50 mg, Oral, Daily    metoprolol succinate (TOPROL-XL) 25 mg, Oral, Daily    mupirocin (BACTROBAN) 2 % ointment Topical (Top), 2 times daily    mupirocin calcium 2% (BACTROBAN) 2 % cream Topical (Top), 3 times daily    naproxen sodium (ANAPROX) 440 mg, Oral, 2 times daily PRN    naproxen-diphenhydrAMINE (ALEVE PM) 220-25 mg Tab 2 tablets, Oral, Nightly    oxyCODONE-acetaminophen (PERCOCET) 5-325 mg per tablet 1 tablet, Oral, Every 4 hours PRN    POLYETHYLENE GLYCOL 3350 (MIRALAX ORAL) 17 g, Oral, Every other day    QUEtiapine (SEROQUEL XR) 200 mg, Oral, Daily, 1 tablet by mouth at bedtime    silodosin (RAPAFLO) 8 mg, Oral, Daily    XARELTO 2.5 " mg, Oral, 2 times daily     Review of patient's allergies indicates:   Allergen Reactions    Lithium analogues Other (See Comments)     coma       Patient Care Team:  Hipolito Lewis MD as PCP - General (Family Medicine)  Iggy Burciaga MD as Consulting Physician (Cardiovascular Disease)        No follow-ups on file. In addition to their scheduled follow up, the patient has also been instructed to follow up on as needed basis.     Future Appointments   Date Time Provider Department Center   12/20/2023  1:10 PM James Marx MD [facility]

## 2023-12-13 ENCOUNTER — OFFICE VISIT (OUTPATIENT)
Dept: VASCULAR SURGERY | Facility: CLINIC | Age: 68
End: 2023-12-13
Payer: MEDICARE

## 2023-12-13 VITALS
DIASTOLIC BLOOD PRESSURE: 67 MMHG | HEIGHT: 68 IN | SYSTOLIC BLOOD PRESSURE: 137 MMHG | HEART RATE: 62 BPM | BODY MASS INDEX: 23.95 KG/M2 | WEIGHT: 158 LBS

## 2023-12-13 DIAGNOSIS — I70.335 ATHEROSCLEROSIS OF BYPASS GRAFT OF RIGHT LOWER EXTREMITY WITH ULCERATION OF OTHER PART OF FOOT: Primary | ICD-10-CM

## 2023-12-13 DIAGNOSIS — Z98.890 S/P FEMORAL-TIBIAL BYPASS: ICD-10-CM

## 2023-12-13 PROCEDURE — 99024 PR POST-OP FOLLOW-UP VISIT: ICD-10-PCS | Mod: ,,, | Performed by: SURGERY

## 2023-12-13 PROCEDURE — 3078F PR MOST RECENT DIASTOLIC BLOOD PRESSURE < 80 MM HG: ICD-10-PCS | Mod: CPTII,,, | Performed by: SURGERY

## 2023-12-13 PROCEDURE — 1101F PR PT FALLS ASSESS DOC 0-1 FALLS W/OUT INJ PAST YR: ICD-10-PCS | Mod: CPTII,,, | Performed by: SURGERY

## 2023-12-13 PROCEDURE — 3078F DIAST BP <80 MM HG: CPT | Mod: CPTII,,, | Performed by: SURGERY

## 2023-12-13 PROCEDURE — 1159F MED LIST DOCD IN RCRD: CPT | Mod: CPTII,,, | Performed by: SURGERY

## 2023-12-13 PROCEDURE — 1160F PR REVIEW ALL MEDS BY PRESCRIBER/CLIN PHARMACIST DOCUMENTED: ICD-10-PCS | Mod: CPTII,,, | Performed by: SURGERY

## 2023-12-13 PROCEDURE — 1101F PT FALLS ASSESS-DOCD LE1/YR: CPT | Mod: CPTII,,, | Performed by: SURGERY

## 2023-12-13 PROCEDURE — 3075F SYST BP GE 130 - 139MM HG: CPT | Mod: CPTII,,, | Performed by: SURGERY

## 2023-12-13 PROCEDURE — 4010F ACE/ARB THERAPY RXD/TAKEN: CPT | Mod: CPTII,,, | Performed by: SURGERY

## 2023-12-13 PROCEDURE — 99024 POSTOP FOLLOW-UP VISIT: CPT | Mod: ,,, | Performed by: SURGERY

## 2023-12-13 PROCEDURE — 1160F RVW MEDS BY RX/DR IN RCRD: CPT | Mod: CPTII,,, | Performed by: SURGERY

## 2023-12-13 PROCEDURE — 3075F PR MOST RECENT SYSTOLIC BLOOD PRESS GE 130-139MM HG: ICD-10-PCS | Mod: CPTII,,, | Performed by: SURGERY

## 2023-12-13 PROCEDURE — 3288F PR FALLS RISK ASSESSMENT DOCUMENTED: ICD-10-PCS | Mod: CPTII,,, | Performed by: SURGERY

## 2023-12-13 PROCEDURE — 3288F FALL RISK ASSESSMENT DOCD: CPT | Mod: CPTII,,, | Performed by: SURGERY

## 2023-12-13 PROCEDURE — 4010F PR ACE/ARB THEARPY RXD/TAKEN: ICD-10-PCS | Mod: CPTII,,, | Performed by: SURGERY

## 2023-12-13 PROCEDURE — 1159F PR MEDICATION LIST DOCUMENTED IN MEDICAL RECORD: ICD-10-PCS | Mod: CPTII,,, | Performed by: SURGERY

## 2023-12-18 NOTE — PROGRESS NOTES
"    [facility] Vascular - Clinic Note  James Marx MD      Patient Name: Deniz Pace                   : 1955      MRN: [id number]   Visit Date: 2023       History Present Illness     Reason for Visit:  Staple Removal    Mr. Pace presents to the clinic for removal of staples to his right lower leg incision. He is s/p right femoral-to- posterior tibial bypass 23 secondary to right foot wounds. He reports continued improvement in his swelling.     REVIEW OF SYSTEMS:    12 point review of systems conducted, negative except as stated in the history of present illness. See HPI for details.        Physical Exam      Vitals:    23 1315 23 1318   BP: 137/69 (!) 142/85   BP Location: Left arm Right arm   Pulse: 61 60   Weight: 71.7 kg (158 lb)    Height: 5' 8" (1.727 m)        On exam, his right lower extremity incision is healing well.                Assessment and Plan     Mr. Pace is a 68 y.o.   Status post bypass of the right lower extremity.  Staples were removed in the office today.  Have him obtain a 6 week postop LORY.  I would then like to see him back in the office in 6 months with a repeat LORY and duplex of his graft at that time.      1. 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 Silverio Schroeder    MI (myocardial infarction)     Neuropathy     LEONID (obstructive sleep apnea)     " "uses CPAP    PVD (peripheral vascular disease)      Past Surgical History:   Procedure Laterality Date    COLONOSCOPY      CORONARY ARTERY BYPASS GRAFT  2013    CORONARY STENT PLACEMENT      CREATION OF FEMORAL-TIBIAL ARTERY BYPASS Right 11/27/2023    Procedure: CREATION, BYPASS, ARTERIAL, FEMORAL TO TIBIAL;  Surgeon: James Marx MD;  Location: [facility] OR;  Service: Peripheral Vascular;  Laterality: Right;  vascular table    ENDARTERECTOMY OF FEMORAL ARTERY Right 11/27/2023    Procedure: ENDARTERECTOMY, FEMORAL;  Surgeon: James Marx MD;  Location: [facility] OR;  Service: Peripheral Vascular;  Laterality: Right;    FEMORAL ARTERY STENT Left     HIP REPLACEMENT ARTHROPLASTY Left     INSERTION OF PACEMAKER      TOTAL KNEE ARTHROPLASTY Right     TOTAL SHOULDER ARTHROPLASTY Right      History reviewed. No pertinent family history.  Social History     Socioeconomic History    Marital status: Single   Tobacco Use    Smoking status: Never    Smokeless tobacco: Never   Substance and Sexual Activity    Alcohol use: Not Currently     Comment: "recovering alcoholic"    Drug use: Never     Social Determinants of Health     Social Connections: Moderately Integrated (11/28/2023)    Social Connection and Isolation Panel [NHANES]     Frequency of Communication with Friends and Family: Twice a week     Frequency of Social Gatherings with Friends and Family: Twice a week     Attends Jehovah[employer] witness Services: More than 4 times per year     Active Member of Clubs or Organizations: Yes     Attends Club or Organization Meetings: More than 4 times per year     Marital Status: Never      Current Outpatient Medications   Medication Instructions    atorvastatin (LIPITOR) 80 mg, Oral, Nightly    cholecalciferol, vitamin D3, (VITAMIN D3) 50 mcg (2,000 unit) Tab 1 tablet, Oral, Daily    clopidogreL (PLAVIX) 75 mg, Oral, Daily    DULoxetine (CYMBALTA) 60 mg, Oral, Daily    finasteride (PROSCAR) 5 mg, Oral, Daily    folic " acid/multivit-min/lutein (CENTRUM SILVER ORAL) 1 tablet, Oral, Daily    losartan (COZAAR) 50 mg, Oral, Daily    metoprolol succinate (TOPROL-XL) 25 mg, Oral, Daily    mupirocin (BACTROBAN) 2 % ointment Topical (Top), 2 times daily    mupirocin calcium 2% (BACTROBAN) 2 % cream Topical (Top), 3 times daily    naproxen sodium (ANAPROX) 440 mg, Oral, 2 times daily PRN    naproxen-diphenhydrAMINE (ALEVE PM) 220-25 mg Tab 2 tablets, Oral, Nightly    POLYETHYLENE GLYCOL 3350 (MIRALAX ORAL) 17 g, Oral, Every other day    QUEtiapine (SEROQUEL XR) 200 mg, Oral, Daily, 1 tablet by mouth at bedtime    silodosin (RAPAFLO) 8 mg, Oral, Daily    traZODone (DESYREL) 50 mg, Oral, Nightly PRN    XARELTO 2.5 mg, Oral, 2 times daily     Review of patient's allergies indicates:   Allergen Reactions    Lithium analogues Other (See Comments)     coma       Patient Care Team:  Hipolito Lewis MD as PCP - General (Family Medicine)  Iggy Burciaga MD as Consulting Physician (Cardiovascular Disease)        No follow-ups on file. In addition to their scheduled follow up, the patient has also been instructed to follow up on as needed basis.     Future Appointments   Date Time Provider Department Center   6/26/2024 10:15 AM CV [facility] VASCULAR SURGERY  [address] [facility]   6/26/2024 10:40 AM James Marx MD [facility]

## 2023-12-20 ENCOUNTER — OFFICE VISIT (OUTPATIENT)
Dept: VASCULAR SURGERY | Facility: CLINIC | Age: 68
End: 2023-12-20
Payer: MEDICARE

## 2023-12-20 VITALS
BODY MASS INDEX: 23.95 KG/M2 | HEIGHT: 68 IN | SYSTOLIC BLOOD PRESSURE: 142 MMHG | WEIGHT: 158 LBS | HEART RATE: 60 BPM | DIASTOLIC BLOOD PRESSURE: 85 MMHG

## 2023-12-20 DIAGNOSIS — I70.335 ATHEROSCLEROSIS OF BYPASS GRAFT OF RIGHT LOWER EXTREMITY WITH ULCERATION OF OTHER PART OF FOOT: Primary | ICD-10-CM

## 2023-12-20 PROCEDURE — 99024 POSTOP FOLLOW-UP VISIT: CPT | Mod: ,,, | Performed by: SURGERY

## 2023-12-20 PROCEDURE — 1159F MED LIST DOCD IN RCRD: CPT | Mod: CPTII,,, | Performed by: SURGERY

## 2023-12-20 PROCEDURE — 1101F PT FALLS ASSESS-DOCD LE1/YR: CPT | Mod: CPTII,,, | Performed by: SURGERY

## 2023-12-20 PROCEDURE — 3077F SYST BP >= 140 MM HG: CPT | Mod: CPTII,,, | Performed by: SURGERY

## 2023-12-20 PROCEDURE — 3288F FALL RISK ASSESSMENT DOCD: CPT | Mod: CPTII,,, | Performed by: SURGERY

## 2023-12-20 PROCEDURE — 3079F DIAST BP 80-89 MM HG: CPT | Mod: CPTII,,, | Performed by: SURGERY

## 2023-12-20 RX ORDER — TRAZODONE HYDROCHLORIDE 50 MG/1
50 TABLET ORAL NIGHTLY PRN
COMMUNITY
Start: 2023-12-12

## 2024-01-03 ENCOUNTER — TELEPHONE (OUTPATIENT)
Dept: VASCULAR SURGERY | Facility: CLINIC | Age: 69
End: 2024-01-03
Payer: MEDICARE

## 2024-01-03 NOTE — TELEPHONE ENCOUNTER
ABIs reviewed by Dr. Marx.No changes to plan of care. Maintain upcoming follow up. Left message for follow up.    Call returned. Recommendations provided. Mr. Pace verablizes understanding.

## 2024-06-14 RX ORDER — NITROFURANTOIN 25; 75 MG/1; MG/1
100 CAPSULE ORAL NIGHTLY
COMMUNITY
Start: 2023-12-26

## 2024-06-25 DIAGNOSIS — I70.335 ATHEROSCLEROSIS OF BYPASS GRAFT OF RIGHT LOWER EXTREMITY WITH ULCERATION OF OTHER PART OF FOOT: ICD-10-CM

## 2024-06-25 DIAGNOSIS — Z98.890 S/P FEMORAL-TIBIAL BYPASS: Primary | ICD-10-CM

## 2024-06-25 NOTE — PROGRESS NOTES
"    [facility] Vascular - Clinic Note  James Marx MD      Patient Name: Deniz Pace                   : 1955      MRN: [id number]   Visit Date: 2024       History Present Illness     Reason for Visit: Arterial Disease    Mr. Pace presents to the clinic for 6 month follow up s/p right femoral-tibial bypass on 2023. Arterial duplex and ABIs obtained today.  He reports he was done well since his last visit.  He denies any claudication symptoms.  He still has a persistent toe wound for which he follows Dr. Mejia.  He was on Xarelto, Plavix, and Lipitor.      REVIEW OF SYSTEMS:  12 point review of systems conducted, negative except as stated in the history of present illness. See HPI for details.        Physical Exam      Vitals:    24 1058 24 1059   BP: 127/84 (!) 143/81   BP Location: Left arm Right arm   Pulse: 60 60   Weight: 78.5 kg (173 lb)    Height: 5' 8" (1.727 m)           General: well-nourished, no acute distress, and healthy appearing, alert, pleasant, conversant, and oriented  Neurologic: cranial nerves are grossly intact, no neurologic deficits, no motor deficits, and no sensory deficits  Respiratory: breathing easily and without respiratory distress  Cardiology: regular rate and rhythm      Lower Extremity Arterial Exam:  Left - posterior tibial is triphasic with doppler    Musculoskeletal:   Lower Extremity: no edema present to bilateral lower extremities     Wound:   Location: right second toe              Assessment and Plan     Mr. Pace is a 68 y.o. male with right lower extremity critical limb ischemia status post revascularization.  He was bypass is open and he does have an adequate LORY in the right lower extremity.  At this point I recommend continued Xarelto and Plavix as well as a statin.  I would like to see him back in a year with a repeat LORY and duplex of the right lower extremity.  I did discuss with him that should he have worsening of his " symptoms were worsening of his wound he can call for an appointment sooner.  Currently his revascularization is adequate.  He will need more wound care to finish healing the toe.        1. 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 S/P femoral-tibial bypass          Imaging Obtained/Reviewed   Study: ankle brachial index and duplex of bypass  Date:   6/26/2024    LORY 1.12 right 0.56 left  Graft is patent.  There was some question of a mild stenosis at the distal anastomosis.      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 Silverio Schroeder    MI (myocardial infarction)     Neuropathy     LEONID (obstructive sleep apnea)     uses CPAP    PVD (peripheral vascular disease)      Past Surgical History:   Procedure Laterality Date    COLONOSCOPY      CORONARY ARTERY BYPASS GRAFT  2013    CORONARY STENT PLACEMENT      CREATION OF FEMORAL-TIBIAL ARTERY BYPASS Right 11/27/2023    Procedure: CREATION, BYPASS, ARTERIAL, FEMORAL TO TIBIAL;  Surgeon: James Marx MD;  Location: [facility];  Service: Peripheral Vascular;  Laterality: Right;  vascular table    ENDARTERECTOMY OF FEMORAL ARTERY Right 11/27/2023    Procedure: ENDARTERECTOMY, FEMORAL;  Surgeon: James Marx MD;  Location: [facility];  Service: Peripheral Vascular;  Laterality: Right;    FEMORAL ARTERY STENT Left     HIP REPLACEMENT ARTHROPLASTY Left     INSERTION OF PACEMAKER      TOTAL KNEE ARTHROPLASTY Right     TOTAL SHOULDER ARTHROPLASTY Right      No family history on file.  Social History     Socioeconomic History    Marital status: Single   Tobacco Use     "Smoking status: Never    Smokeless tobacco: Never   Substance and Sexual Activity    Alcohol use: Not Currently     Comment: "recovering alcoholic"    Drug use: Never     Current Outpatient Medications   Medication Instructions    atorvastatin (LIPITOR) 80 mg, Oral, Nightly    cholecalciferol, vitamin D3, (VITAMIN D3) 50 mcg (2,000 unit) Tab 1 tablet, Oral, Daily    clopidogreL (PLAVIX) 75 mg, Oral, Daily    DULoxetine (CYMBALTA) 60 mg, Oral, Daily    finasteride (PROSCAR) 5 mg, Oral, Daily    folic acid/multivit-min/lutein (CENTRUM SILVER ORAL) 1 tablet, Oral, Daily    losartan (COZAAR) 50 mg, Oral, Daily    metoprolol succinate (TOPROL-XL) 25 mg, Oral, Daily    mupirocin (BACTROBAN) 2 % ointment Topical (Top), 2 times daily    mupirocin calcium 2% (BACTROBAN) 2 % cream Topical (Top), 3 times daily    naproxen sodium (ANAPROX) 440 mg, Oral, 2 times daily PRN    naproxen-diphenhydrAMINE (ALEVE PM) 220-25 mg Tab 2 tablets, Oral, Nightly    nitrofurantoin, macrocrystal-monohydrate, (MACROBID) 100 MG capsule 100 mg, Oral, Nightly    POLYETHYLENE GLYCOL 3350 (MIRALAX ORAL) 17 g, Oral, Every other day    QUEtiapine (SEROQUEL XR) 200 mg, Oral, Daily, 1 tablet by mouth at bedtime    silodosin (RAPAFLO) 8 mg, Oral, Daily    traZODone (DESYREL) 50 mg, Oral, Nightly PRN    XARELTO 2.5 mg, Oral, 2 times daily     Review of patient's allergies indicates:   Allergen Reactions    Lithium analogues Other (See Comments)     coma       Patient Care Team:  Hipolito Lewis MD as PCP - General (Family Medicine)  Iggy Burciaga MD as Consulting Physician (Cardiovascular Disease)        No follow-ups on file. In addition to their scheduled follow up, the patient has also been instructed to follow up on as needed basis.     No future appointments.       "

## 2024-06-26 ENCOUNTER — OFFICE VISIT (OUTPATIENT)
Dept: VASCULAR SURGERY | Facility: CLINIC | Age: 69
End: 2024-06-26
Attending: SURGERY
Payer: MEDICARE

## 2024-06-26 VITALS
SYSTOLIC BLOOD PRESSURE: 143 MMHG | HEART RATE: 60 BPM | BODY MASS INDEX: 26.22 KG/M2 | WEIGHT: 173 LBS | DIASTOLIC BLOOD PRESSURE: 81 MMHG | HEIGHT: 68 IN

## 2024-06-26 DIAGNOSIS — I70.335 ATHEROSCLEROSIS OF BYPASS GRAFT OF RIGHT LOWER EXTREMITY WITH ULCERATION OF OTHER PART OF FOOT: Primary | ICD-10-CM

## 2024-06-26 DIAGNOSIS — Z98.890 S/P FEMORAL-TIBIAL BYPASS: ICD-10-CM

## 2025-07-21 NOTE — PROGRESS NOTES
[facility] Vascular - Clinic Note  James Marx MD      Patient Name: Deniz Pace                   : 1955      MRN: [id number]   Visit Date: 2025       History Present Illness     Reason for Visit: Arterial Disease    Mr. Pace presents to the clinic for 1 year surveillance of lower extremity arterial disease. He  is s/p right femoral-tibial bypass on 2023 secondary to non-healing toe wounds. He reports the wound to his 2nd toe has healed. He is able to walk about 3 miles a day without significant restrictions. He denies any changes to his health since last office visit.  He is on Plavix, Xarelto, and Lipitor.  He denies any complaints the left lower extremity        REVIEW OF SYSTEMS:  12 point review of systems conducted, negative except as stated in the history of present illness. See HPI for details.        Physical Exam      Vitals:    25 0856 25 0859   BP: 126/76 114/76   BP Location: Right arm Left arm   Pulse: 61           General: well-nourished, no acute distress, and healthy appearing, alert, pleasant, conversant, and oriented  Neurologic: cranial nerves are grossly intact, no neurologic deficits, no motor deficits, and no sensory deficits  Neck/Chest: normal , soft without lymphadenopathy, and no carotid bruits noted  Respiratory: breathing easily, without respiratory distress, and normal breath sounds  Abdomen: normal and soft  Cardiology: regular rate and rhythm and no audible murmur    Upper Extremity Arterial Exam:   Right - radial is palpable and brachial is palpable  Left - radial is palpable and brachial is palpable    Lower Extremity Arterial Exam:  Right - posterior tibial is triphasic with doppler    Musculoskeletal:   Upper Extremity: normal bilateral hand function  Lower Extremity: no edema present to bilateral lower extremities     Right foot:  Right 2nd toe wound completely healed          Assessment and Plan     Mr. Pace is a 69 y.o. male with  right lower extremity critical limb ischemia status post right fem PT bypass with PTFE for critical limb ischemia.  His wounds have completely healed.  He has no significant claudication symptoms.  ABIs and duplex are suggestive of good flow through the bypass.  I do recommend he continue antiplatelet and Xarelto therapies as well as statin.  I would like to see him back in a year with a repeat LORY and duplex at that time.  I did discuss with him if she develops any wounds to the left foot or worsening symptoms the right leg he should call for an appointment sooner.        1. 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 S/P femoral-tibial bypass          Imaging Obtained/Reviewed   Study: ankle brachial index and right lower extremity arterial duplex  Date:   7/23/2025  ABIs show a value of 1.01 on the right and 0.58 on the left.  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 Sivlerio Schroeder    MI (myocardial infarction)     Neuropathy     LEONID (obstructive sleep apnea)     uses CPAP    PVD (peripheral vascular disease)      Past Surgical History:   Procedure Laterality Date    COLONOSCOPY      CORONARY ARTERY BYPASS GRAFT  2013    CORONARY STENT PLACEMENT      CREATION OF FEMORAL-TIBIAL ARTERY BYPASS Right 11/27/2023    Procedure: CREATION, BYPASS, ARTERIAL, FEMORAL TO TIBIAL;  Surgeon: James aMrx MD;  Location: [facility];  Service:  Peripheral Vascular;  Laterality: Right;  vascular table    ENDARTERECTOMY OF FEMORAL ARTERY Right 11/27/2023    Procedure: ENDARTERECTOMY, FEMORAL;  Surgeon: James Marx MD;  Location: [facility];  Service: Peripheral Vascular;  Laterality: Right;    FEMORAL ARTERY STENT Left     HIP REPLACEMENT ARTHROPLASTY Left     INSERTION OF PACEMAKER      TOTAL KNEE ARTHROPLASTY Right     TOTAL SHOULDER ARTHROPLASTY Right      No family history on file.  Social History[1]  Current Outpatient Medications   Medication Instructions    atorvastatin (LIPITOR) 80 mg, Nightly    cholecalciferol, vitamin D3, (VITAMIN D3) 50 mcg (2,000 unit) Tab 1 tablet, Daily    clopidogreL (PLAVIX) 75 mg, Daily    DULoxetine (CYMBALTA) 60 mg, Daily    finasteride (PROSCAR) 5 mg, Daily    folic acid/multivit-min/lutein (CENTRUM SILVER ORAL) 1 tablet, Daily    losartan (COZAAR) 50 mg, Daily    metoprolol succinate (TOPROL-XL) 25 mg, Daily    mupirocin (BACTROBAN) 2 % ointment 2 times daily    mupirocin calcium 2% (BACTROBAN) 2 % cream 3 times daily    naproxen sodium (ANAPROX) 440 mg, Oral, 2 times daily PRN    naproxen-diphenhydrAMINE (ALEVE PM) 220-25 mg Tab 2 tablets, Nightly    nitrofurantoin, macrocrystal-monohydrate, (MACROBID) 100 MG capsule 100 mg, Nightly    POLYETHYLENE GLYCOL 3350 (MIRALAX ORAL) 17 g, Every other day    QUEtiapine (SEROQUEL XR) 200 mg, Daily    silodosin (RAPAFLO) 8 mg, Daily    traZODone (DESYREL) 50 mg, Nightly PRN    XARELTO 2.5 mg, 2 times daily     Review of patient's allergies indicates:   Allergen Reactions    Lithium analogues Other (See Comments)     coma       Patient Care Team:  Hipolito Lewis MD as PCP - General (Family Medicine)  Iggy Burciaga MD as Consulting Physician (Cardiovascular Disease)  Viky Maldonado MD as Vascular Surgery (Vascular Surgery)        No follow-ups on file. In addition to their scheduled follow up, the patient has also been instructed to follow up on as needed  "basis.     Future Appointments   Date Time Provider Department Center   7/29/2026  8:00 AM CV [facility] VASCULAR [facility]   7/29/2026  8:30 AM CV [facility] VASCULAR [facility]   7/29/2026  9:00 AM James Marx MD [facility]               [1]   Social History  Socioeconomic History    Marital status: Single   Tobacco Use    Smoking status: Never    Smokeless tobacco: Never   Substance and Sexual Activity    Alcohol use: Not Currently     Comment: "recovering alcoholic"    Drug use: Never     "

## 2025-07-23 ENCOUNTER — OFFICE VISIT (OUTPATIENT)
Dept: VASCULAR SURGERY | Facility: CLINIC | Age: 70
End: 2025-07-23
Attending: SURGERY
Payer: MEDICARE

## 2025-07-23 VITALS — SYSTOLIC BLOOD PRESSURE: 114 MMHG | HEART RATE: 61 BPM | DIASTOLIC BLOOD PRESSURE: 76 MMHG

## 2025-07-23 DIAGNOSIS — Z98.890 S/P FEMORAL-TIBIAL BYPASS: ICD-10-CM

## 2025-07-23 DIAGNOSIS — I70.335 ATHEROSCLEROSIS OF BYPASS GRAFT OF RIGHT LOWER EXTREMITY WITH ULCERATION OF OTHER PART OF FOOT: Primary | ICD-10-CM

## 2025-07-23 PROCEDURE — 1159F MED LIST DOCD IN RCRD: CPT | Mod: CPTII,,, | Performed by: SURGERY

## 2025-07-23 PROCEDURE — 1160F RVW MEDS BY RX/DR IN RCRD: CPT | Mod: CPTII,,, | Performed by: SURGERY

## 2025-07-23 PROCEDURE — 3074F SYST BP LT 130 MM HG: CPT | Mod: CPTII,,, | Performed by: SURGERY

## 2025-07-23 PROCEDURE — 4010F ACE/ARB THERAPY RXD/TAKEN: CPT | Mod: CPTII,,, | Performed by: SURGERY

## 2025-07-23 PROCEDURE — 1101F PT FALLS ASSESS-DOCD LE1/YR: CPT | Mod: CPTII,,, | Performed by: SURGERY

## 2025-07-23 PROCEDURE — 3288F FALL RISK ASSESSMENT DOCD: CPT | Mod: CPTII,,, | Performed by: SURGERY

## 2025-07-23 PROCEDURE — 3078F DIAST BP <80 MM HG: CPT | Mod: CPTII,,, | Performed by: SURGERY

## 2025-07-23 PROCEDURE — 99213 OFFICE O/P EST LOW 20 MIN: CPT | Mod: ,,, | Performed by: SURGERY

## (undated) DEVICE — Device

## (undated) DEVICE — DRESSING TELFA + BARR 4X6IN

## (undated) DEVICE — ADHESIVE DERMABOND ADVANCED

## (undated) DEVICE — SOL NORMAL USPCA 0.9%

## (undated) DEVICE — SUT CV-6 30 IN TTC-09NDL

## (undated) DEVICE — SUT 4-0 12-18IN SILK BLACK

## (undated) DEVICE — DRAPE SLUSH WARMER 66X44IN

## (undated) DEVICE — SHEET XLGE DRAPE

## (undated) DEVICE — SPONGE LAP XRAY STERILE 18X18

## (undated) DEVICE — SUT 2-0 12-18IN SILK

## (undated) DEVICE — CANNULA ARTOTMY OLIVE TIP 2MM

## (undated) DEVICE — ELECTRODE PATIENT RETURN DISP

## (undated) DEVICE — DRESSING AQUACEL AG 3.5X10IN

## (undated) DEVICE — DRAPE FULL SHEET 70X100IN

## (undated) DEVICE — HEMOSTAT SURGICEL PWD 3G

## (undated) DEVICE — KIT SURGICAL TURNOVER

## (undated) DEVICE — SUT PROLENE 5-0 24 C-1 BL

## (undated) DEVICE — GLOVE PROTEXIS HYDROGEL SZ6.5

## (undated) DEVICE — DISH PETRI MED 3.5IN

## (undated) DEVICE — DRAPE BAG ISOLATION 20 X 20

## (undated) DEVICE — STAPLER SKIN PROXIMATE WIDE

## (undated) DEVICE — LOOP VESSEL BLUE MINI 2/CARD

## (undated) DEVICE — DRAPE C-ARM COVER EZ 36X28IN

## (undated) DEVICE — APPLICATOR CHLORAPREP ORN 26ML

## (undated) DEVICE — SUT VICRYL 3-0 27 SH

## (undated) DEVICE — DRAPE INCISE IOBAN 2 23X23IN

## (undated) DEVICE — SUT PROLENE 6-0 BV-1 30IN

## (undated) DEVICE — SUT MCRYL PLUS 4-0 PS2 27IN

## (undated) DEVICE — KNIFE OPTIMUM STR GRN 15DEG

## (undated) DEVICE — SPONGE SURGIFOAM 100 8.5X12X10

## (undated) DEVICE — SUT GORETEX CV-6 TTC-09 24IN

## (undated) DEVICE — CLIP LIGATING MEDIUM

## (undated) DEVICE — COVER PROBE US 5.5X58L NON LTX

## (undated) DEVICE — LOOP VESSEL RED MINI STERILE

## (undated) DEVICE — SUT K835H SILK 1-0

## (undated) DEVICE — DRESSING AQUACEL AG ADV 3.5X6

## (undated) DEVICE — PROBE DOPPLER VTI DISP 8MHZ

## (undated) DEVICE — SUT VICRYL 2-0 27 CT-1

## (undated) DEVICE — TRAY CATH FOL SIL URIMTR 16FR